# Patient Record
Sex: FEMALE | Race: WHITE | Employment: UNEMPLOYED | URBAN - METROPOLITAN AREA
[De-identification: names, ages, dates, MRNs, and addresses within clinical notes are randomized per-mention and may not be internally consistent; named-entity substitution may affect disease eponyms.]

---

## 2023-07-18 ENCOUNTER — APPOINTMENT (OUTPATIENT)
Dept: GENERAL RADIOLOGY | Age: 83
DRG: 504 | End: 2023-07-18
Payer: MEDICARE

## 2023-07-18 ENCOUNTER — HOSPITAL ENCOUNTER (INPATIENT)
Age: 83
LOS: 7 days | Discharge: HOME | DRG: 504 | End: 2023-07-25
Attending: EMERGENCY MEDICINE | Admitting: INTERNAL MEDICINE
Payer: MEDICARE

## 2023-07-18 ENCOUNTER — APPOINTMENT (OUTPATIENT)
Dept: MRI IMAGING | Age: 83
DRG: 504 | End: 2023-07-18
Payer: MEDICARE

## 2023-07-18 DIAGNOSIS — L08.9 RIGHT FOOT INFECTION: Primary | ICD-10-CM

## 2023-07-18 DIAGNOSIS — M86.171 ACUTE OSTEOMYELITIS OF RIGHT CALCANEUS (HCC): ICD-10-CM

## 2023-07-18 PROBLEM — I82.512 CHRONIC DEEP VEIN THROMBOSIS (DVT) OF FEMORAL VEIN OF LEFT LOWER EXTREMITY (HCC): Status: ACTIVE | Noted: 2019-07-12

## 2023-07-18 PROBLEM — R53.1 GENERALIZED WEAKNESS: Status: ACTIVE | Noted: 2023-05-13

## 2023-07-18 PROBLEM — I10 BENIGN ESSENTIAL HYPERTENSION: Status: ACTIVE | Noted: 2020-11-27

## 2023-07-18 LAB
ANION GAP SERPL CALCULATED.3IONS-SCNC: 9 MMOL/L (ref 9–17)
BASOPHILS # BLD: 0.09 K/UL (ref 0–0.2)
BASOPHILS NFR BLD: 1 % (ref 0–2)
BUN SERPL-MCNC: 15 MG/DL (ref 8–23)
BUN/CREAT SERPL: 21 (ref 9–20)
CALCIUM SERPL-MCNC: 8.9 MG/DL (ref 8.6–10.4)
CHLORIDE SERPL-SCNC: 104 MMOL/L (ref 98–107)
CO2 SERPL-SCNC: 27 MMOL/L (ref 20–31)
CREAT SERPL-MCNC: 0.7 MG/DL (ref 0.5–0.9)
CRP SERPL HS-MCNC: 17.5 MG/L (ref 0–5)
EOSINOPHIL # BLD: 0.66 K/UL (ref 0–0.44)
EOSINOPHILS RELATIVE PERCENT: 8 % (ref 1–4)
ERYTHROCYTE [DISTWIDTH] IN BLOOD BY AUTOMATED COUNT: 15.8 % (ref 11.8–14.4)
ERYTHROCYTE [SEDIMENTATION RATE] IN BLOOD BY PHOTOMETRIC METHOD: 84 MM/HR (ref 0–30)
GFR SERPL CREATININE-BSD FRML MDRD: >60 ML/MIN/1.73M2
GLUCOSE SERPL-MCNC: 88 MG/DL (ref 70–99)
HCT VFR BLD AUTO: 38.9 % (ref 36.3–47.1)
HGB BLD-MCNC: 11.8 G/DL (ref 11.9–15.1)
IMM GRANULOCYTES # BLD AUTO: 0.03 K/UL (ref 0–0.3)
IMM GRANULOCYTES NFR BLD: 0 %
LYMPHOCYTES # BLD: 26 % (ref 24–43)
LYMPHOCYTES NFR BLD: 2.28 K/UL (ref 1.1–3.7)
MCH RBC QN AUTO: 28.5 PG (ref 25.2–33.5)
MCHC RBC AUTO-ENTMCNC: 30.3 G/DL (ref 28.4–34.8)
MCV RBC AUTO: 94 FL (ref 82.6–102.9)
MONOCYTES NFR BLD: 0.59 K/UL (ref 0.1–1.2)
MONOCYTES NFR BLD: 7 % (ref 3–12)
NEUTROPHILS NFR BLD: 58 % (ref 36–65)
NEUTS SEG NFR BLD: 5.17 K/UL (ref 1.5–8.1)
NRBC BLD-RTO: 0 PER 100 WBC
PLATELET # BLD AUTO: 311 K/UL (ref 138–453)
PMV BLD AUTO: 10.2 FL (ref 8.1–13.5)
POTASSIUM SERPL-SCNC: 4 MMOL/L (ref 3.7–5.3)
RBC # BLD AUTO: 4.14 M/UL (ref 3.95–5.11)
RBC # BLD: ABNORMAL 10*6/UL
SODIUM SERPL-SCNC: 140 MMOL/L (ref 135–144)
WBC OTHER # BLD: 8.8 K/UL (ref 3.5–11.3)

## 2023-07-18 PROCEDURE — 99285 EMERGENCY DEPT VISIT HI MDM: CPT

## 2023-07-18 PROCEDURE — 2580000003 HC RX 258: Performed by: EMERGENCY MEDICINE

## 2023-07-18 PROCEDURE — 87070 CULTURE OTHR SPECIMN AEROBIC: CPT

## 2023-07-18 PROCEDURE — 6360000002 HC RX W HCPCS

## 2023-07-18 PROCEDURE — 86403 PARTICLE AGGLUT ANTBDY SCRN: CPT

## 2023-07-18 PROCEDURE — 1200000000 HC SEMI PRIVATE

## 2023-07-18 PROCEDURE — 6370000000 HC RX 637 (ALT 250 FOR IP): Performed by: EMERGENCY MEDICINE

## 2023-07-18 PROCEDURE — 85027 COMPLETE CBC AUTOMATED: CPT

## 2023-07-18 PROCEDURE — 80048 BASIC METABOLIC PNL TOTAL CA: CPT

## 2023-07-18 PROCEDURE — 73721 MRI JNT OF LWR EXTRE W/O DYE: CPT

## 2023-07-18 PROCEDURE — 6360000002 HC RX W HCPCS: Performed by: NURSE PRACTITIONER

## 2023-07-18 PROCEDURE — 73630 X-RAY EXAM OF FOOT: CPT

## 2023-07-18 PROCEDURE — 87077 CULTURE AEROBIC IDENTIFY: CPT

## 2023-07-18 PROCEDURE — 87205 SMEAR GRAM STAIN: CPT

## 2023-07-18 PROCEDURE — 87186 SC STD MICRODIL/AGAR DIL: CPT

## 2023-07-18 PROCEDURE — 87040 BLOOD CULTURE FOR BACTERIA: CPT

## 2023-07-18 PROCEDURE — 6360000002 HC RX W HCPCS: Performed by: EMERGENCY MEDICINE

## 2023-07-18 PROCEDURE — 2580000003 HC RX 258: Performed by: NURSE PRACTITIONER

## 2023-07-18 PROCEDURE — 99222 1ST HOSP IP/OBS MODERATE 55: CPT | Performed by: NURSE PRACTITIONER

## 2023-07-18 PROCEDURE — 36415 COLL VENOUS BLD VENIPUNCTURE: CPT

## 2023-07-18 PROCEDURE — 86140 C-REACTIVE PROTEIN: CPT

## 2023-07-18 PROCEDURE — 6370000000 HC RX 637 (ALT 250 FOR IP): Performed by: NURSE PRACTITIONER

## 2023-07-18 PROCEDURE — 87075 CULTR BACTERIA EXCEPT BLOOD: CPT

## 2023-07-18 PROCEDURE — 85652 RBC SED RATE AUTOMATED: CPT

## 2023-07-18 RX ORDER — OXYCODONE HYDROCHLORIDE 5 MG/1
2.5 TABLET ORAL ONCE
Status: COMPLETED | OUTPATIENT
Start: 2023-07-18 | End: 2023-07-18

## 2023-07-18 RX ORDER — POTASSIUM CHLORIDE 20 MEQ/1
40 TABLET, EXTENDED RELEASE ORAL PRN
Status: DISCONTINUED | OUTPATIENT
Start: 2023-07-18 | End: 2023-07-25 | Stop reason: HOSPADM

## 2023-07-18 RX ORDER — METRONIDAZOLE 500 MG/100ML
500 INJECTION, SOLUTION INTRAVENOUS EVERY 8 HOURS
Status: DISCONTINUED | OUTPATIENT
Start: 2023-07-18 | End: 2023-07-18

## 2023-07-18 RX ORDER — CLINDAMYCIN PHOSPHATE 600 MG/50ML
600 INJECTION INTRAVENOUS EVERY 6 HOURS
Status: DISCONTINUED | OUTPATIENT
Start: 2023-07-18 | End: 2023-07-23

## 2023-07-18 RX ORDER — POLYETHYLENE GLYCOL 3350 17 G/17G
17 POWDER, FOR SOLUTION ORAL DAILY
Status: ON HOLD | COMMUNITY
Start: 2023-07-15 | End: 2023-07-20

## 2023-07-18 RX ORDER — SODIUM CHLORIDE 0.9 % (FLUSH) 0.9 %
5-40 SYRINGE (ML) INJECTION EVERY 12 HOURS SCHEDULED
Status: DISCONTINUED | OUTPATIENT
Start: 2023-07-18 | End: 2023-07-25 | Stop reason: HOSPADM

## 2023-07-18 RX ORDER — ACETAMINOPHEN 650 MG/1
650 SUPPOSITORY RECTAL EVERY 6 HOURS PRN
Status: DISCONTINUED | OUTPATIENT
Start: 2023-07-18 | End: 2023-07-25 | Stop reason: HOSPADM

## 2023-07-18 RX ORDER — OXYCODONE HYDROCHLORIDE 5 MG/1
7.5 TABLET ORAL ONCE
Status: DISCONTINUED | OUTPATIENT
Start: 2023-07-18 | End: 2023-07-18

## 2023-07-18 RX ORDER — HYDROCODONE BITARTRATE AND ACETAMINOPHEN 7.5; 325 MG/1; MG/1
1 TABLET ORAL EVERY 6 HOURS PRN
Status: ON HOLD | COMMUNITY
End: 2023-07-20

## 2023-07-18 RX ORDER — SODIUM CHLORIDE 0.9 % (FLUSH) 0.9 %
10 SYRINGE (ML) INJECTION PRN
Status: DISCONTINUED | OUTPATIENT
Start: 2023-07-18 | End: 2023-07-25 | Stop reason: HOSPADM

## 2023-07-18 RX ORDER — HYDROCODONE BITARTRATE AND ACETAMINOPHEN 5; 325 MG/1; MG/1
1 TABLET ORAL ONCE
Status: COMPLETED | OUTPATIENT
Start: 2023-07-18 | End: 2023-07-18

## 2023-07-18 RX ORDER — MORPHINE SULFATE 4 MG/ML
4 INJECTION, SOLUTION INTRAMUSCULAR; INTRAVENOUS ONCE
Status: DISCONTINUED | OUTPATIENT
Start: 2023-07-18 | End: 2023-07-18

## 2023-07-18 RX ORDER — POTASSIUM CHLORIDE 7.45 MG/ML
10 INJECTION INTRAVENOUS PRN
Status: DISCONTINUED | OUTPATIENT
Start: 2023-07-18 | End: 2023-07-25 | Stop reason: HOSPADM

## 2023-07-18 RX ORDER — HYDROCODONE BITARTRATE AND ACETAMINOPHEN 5; 325 MG/1; MG/1
1 TABLET ORAL EVERY 4 HOURS PRN
Status: DISCONTINUED | OUTPATIENT
Start: 2023-07-18 | End: 2023-07-25 | Stop reason: HOSPADM

## 2023-07-18 RX ORDER — POLYETHYLENE GLYCOL 3350 17 G/17G
17 POWDER, FOR SOLUTION ORAL DAILY
Status: DISCONTINUED | OUTPATIENT
Start: 2023-07-18 | End: 2023-07-25 | Stop reason: HOSPADM

## 2023-07-18 RX ORDER — AMOXICILLIN 250 MG
2 CAPSULE ORAL NIGHTLY PRN
COMMUNITY

## 2023-07-18 RX ORDER — MAGNESIUM SULFATE 1 G/100ML
1000 INJECTION INTRAVENOUS PRN
Status: DISCONTINUED | OUTPATIENT
Start: 2023-07-18 | End: 2023-07-25 | Stop reason: HOSPADM

## 2023-07-18 RX ORDER — SENNA AND DOCUSATE SODIUM 50; 8.6 MG/1; MG/1
2 TABLET, FILM COATED ORAL DAILY
Status: DISCONTINUED | OUTPATIENT
Start: 2023-07-18 | End: 2023-07-25 | Stop reason: HOSPADM

## 2023-07-18 RX ORDER — POLYETHYLENE GLYCOL 3350 17 G/17G
17 POWDER, FOR SOLUTION ORAL DAILY PRN
Status: DISCONTINUED | OUTPATIENT
Start: 2023-07-18 | End: 2023-07-25 | Stop reason: HOSPADM

## 2023-07-18 RX ORDER — SODIUM CHLORIDE 9 MG/ML
INJECTION, SOLUTION INTRAVENOUS CONTINUOUS
Status: DISCONTINUED | OUTPATIENT
Start: 2023-07-18 | End: 2023-07-23

## 2023-07-18 RX ORDER — ONDANSETRON 4 MG/1
4 TABLET, ORALLY DISINTEGRATING ORAL EVERY 8 HOURS PRN
Status: DISCONTINUED | OUTPATIENT
Start: 2023-07-18 | End: 2023-07-25 | Stop reason: HOSPADM

## 2023-07-18 RX ORDER — DIPHENHYDRAMINE HCL 25 MG
25 TABLET ORAL EVERY 6 HOURS PRN
Status: DISCONTINUED | OUTPATIENT
Start: 2023-07-18 | End: 2023-07-25 | Stop reason: HOSPADM

## 2023-07-18 RX ORDER — HYDROCODONE BITARTRATE AND ACETAMINOPHEN 5; 325 MG/1; MG/1
2 TABLET ORAL EVERY 4 HOURS PRN
Status: DISCONTINUED | OUTPATIENT
Start: 2023-07-18 | End: 2023-07-25 | Stop reason: HOSPADM

## 2023-07-18 RX ORDER — ONDANSETRON 2 MG/ML
4 INJECTION INTRAMUSCULAR; INTRAVENOUS ONCE
Status: DISCONTINUED | OUTPATIENT
Start: 2023-07-18 | End: 2023-07-18

## 2023-07-18 RX ORDER — ENOXAPARIN SODIUM 100 MG/ML
40 INJECTION SUBCUTANEOUS DAILY
Status: DISCONTINUED | OUTPATIENT
Start: 2023-07-18 | End: 2023-07-23

## 2023-07-18 RX ORDER — HYDROCODONE BITARTRATE AND ACETAMINOPHEN 7.5; 325 MG/1; MG/1
1 TABLET ORAL EVERY 6 HOURS PRN
Status: DISCONTINUED | OUTPATIENT
Start: 2023-07-18 | End: 2023-07-18

## 2023-07-18 RX ORDER — ONDANSETRON 2 MG/ML
4 INJECTION INTRAMUSCULAR; INTRAVENOUS EVERY 6 HOURS PRN
Status: DISCONTINUED | OUTPATIENT
Start: 2023-07-18 | End: 2023-07-25 | Stop reason: HOSPADM

## 2023-07-18 RX ORDER — ACETAMINOPHEN 325 MG/1
650 TABLET ORAL EVERY 6 HOURS PRN
Status: DISCONTINUED | OUTPATIENT
Start: 2023-07-18 | End: 2023-07-25 | Stop reason: HOSPADM

## 2023-07-18 RX ORDER — SODIUM CHLORIDE 9 MG/ML
INJECTION, SOLUTION INTRAVENOUS PRN
Status: DISCONTINUED | OUTPATIENT
Start: 2023-07-18 | End: 2023-07-25 | Stop reason: HOSPADM

## 2023-07-18 RX ADMIN — SODIUM CHLORIDE: 9 INJECTION, SOLUTION INTRAVENOUS at 19:00

## 2023-07-18 RX ADMIN — ENOXAPARIN SODIUM 40 MG: 100 INJECTION SUBCUTANEOUS at 19:34

## 2023-07-18 RX ADMIN — OXYCODONE HYDROCHLORIDE 2.5 MG: 5 TABLET ORAL at 13:42

## 2023-07-18 RX ADMIN — HYDROCODONE BITARTRATE AND ACETAMINOPHEN 1 TABLET: 5; 325 TABLET ORAL at 13:50

## 2023-07-18 RX ADMIN — DIPHENHYDRAMINE HCL 25 MG: 25 TABLET ORAL at 19:01

## 2023-07-18 RX ADMIN — CLINDAMYCIN IN 5 PERCENT DEXTROSE 600 MG: 12 INJECTION, SOLUTION INTRAVENOUS at 20:48

## 2023-07-18 RX ADMIN — CEFEPIME 2000 MG: 2 INJECTION, POWDER, FOR SOLUTION INTRAVENOUS at 19:33

## 2023-07-18 RX ADMIN — VANCOMYCIN HYDROCHLORIDE 2500 MG: 5 INJECTION, POWDER, LYOPHILIZED, FOR SOLUTION INTRAVENOUS at 16:06

## 2023-07-18 RX ADMIN — METRONIDAZOLE 500 MG: 500 INJECTION, SOLUTION INTRAVENOUS at 15:17

## 2023-07-18 ASSESSMENT — PAIN DESCRIPTION - LOCATION
LOCATION: FOOT

## 2023-07-18 ASSESSMENT — PAIN DESCRIPTION - DESCRIPTORS
DESCRIPTORS: ACHING
DESCRIPTORS: ACHING;BURNING
DESCRIPTORS: ACHING

## 2023-07-18 ASSESSMENT — PAIN SCALES - GENERAL
PAINLEVEL_OUTOF10: 8
PAINLEVEL_OUTOF10: 8
PAINLEVEL_OUTOF10: 4

## 2023-07-18 ASSESSMENT — ENCOUNTER SYMPTOMS
CHEST TIGHTNESS: 0
NAUSEA: 0
DIARRHEA: 0
VOMITING: 0
CONSTIPATION: 0
COLOR CHANGE: 1
ABDOMINAL DISTENTION: 0
BACK PAIN: 0
COUGH: 0
EYE DISCHARGE: 0
EYE PAIN: 0
SHORTNESS OF BREATH: 0
FACIAL SWELLING: 0
ABDOMINAL PAIN: 0

## 2023-07-18 ASSESSMENT — PAIN DESCRIPTION - ORIENTATION
ORIENTATION: RIGHT
ORIENTATION: RIGHT
ORIENTATION: RIGHT;LOWER

## 2023-07-18 ASSESSMENT — PAIN - FUNCTIONAL ASSESSMENT: PAIN_FUNCTIONAL_ASSESSMENT: 0-10

## 2023-07-18 NOTE — PROGRESS NOTES
[address]  PROGRESS NOTE    Room # 2010/2010-02   Name: America Simpson              Reason for visit: Routine    I visited the patient. Admit Date & Time: 7/18/2023 12:16 PM    Assessment:  America Simpson is a 79 y.o. female. Upon entering the room patient states well, states no major needs or prayers. Patient kindly declines Spiritual Care visit.  leaves prayer card for patient for possible follow up as needed. Intervention:   provided a ministry presence. Outcome:  Patient grateful for the visit. Plan:  Chaplains will remain available to offer spiritual and emotional support as needed. Electronically signed by Chaplain Derek, on 7/18/2023 at 7:28 PM.  [address]      07/18/23 1926   Encounter Summary   Service Provided For: Patient   Referral/Consult From: [facility]   Support System Unknown   Last Encounter  07/18/23   Complexity of Encounter Low   Begin Time 0620   End Time  0625   Total Time Calculated 5 min   Encounter    Type Initial Screen/Assessment   Assessment/Intervention/Outcome   Assessment Coping   Intervention Sustaining Presence/Ministry of presence   Outcome Refused/Declined

## 2023-07-18 NOTE — CONSULTS
Consultation Note  Podiatric Medicine and Surgery     Subjective     Chief Complaint: right heel wound    HPI:  Columba Sidhu is a 79 y.o. female seen at [facility] ED for nonhealing right heel wound and possible concern for infection. Patient was evaluated by podiatrist from [place], who advised her to present to ER for concern for worsening wound infection. Patient states she has had this heel wound for quite some time, not currently on antibiotics. Patient reports wound is currently tender to touch, denies any changes in activity or trauma. Patient currently resides at an outside facility, reports she is not very active. Denies any other pedal complaints at this time. PCP is Kari Mckeon MD    ROS:   Review of Systems   Constitutional:  Negative for chills and fever. Respiratory:  Negative for chest tightness and shortness of breath. Cardiovascular:  Negative for chest pain and leg swelling. Gastrointestinal:  Negative for abdominal pain, constipation, diarrhea, nausea and vomiting. Musculoskeletal:  Positive for gait problem and myalgias. Negative for arthralgias and joint swelling. Skin:  Positive for color change and wound. Neurological:  Negative for weakness and numbness. Past Medical History   has no past medical history on file. Past Surgical History   has no past surgical history on file. Medications  Prior to Admission medications    Medication Sig Start Date End Date Taking? Authorizing Provider   apixaban (ELIQUIS) 5 MG TABS tablet Take 1 tablet by mouth 2 times daily   Yes Historical Provider, MD   HYDROcodone-acetaminophen (NORCO) 7.5-325 MG per tablet Take 1 tablet by mouth every 6 hours as needed for Pain.  Max Daily Amount: 4 tablets   Yes Historical Provider, MD   piperacillin-tazobactam (ZOSYN) 4-0.5 GM per 100ML IVPB Infuse 100 mLs intravenously every 6 hours   Yes Historical Provider, MD   senna-docusate (PERICOLACE) 8.6-50 MG per tablet Take 2 tablets by

## 2023-07-19 PROBLEM — Z79.01 ON CONTINUOUS ORAL ANTICOAGULATION: Status: ACTIVE | Noted: 2023-07-19

## 2023-07-19 PROBLEM — I87.2 VENOUS INSUFFICIENCY: Status: ACTIVE | Noted: 2023-07-19

## 2023-07-19 PROBLEM — G47.33 OSA (OBSTRUCTIVE SLEEP APNEA): Status: ACTIVE | Noted: 2023-07-19

## 2023-07-19 LAB
ANION GAP SERPL CALCULATED.3IONS-SCNC: 9 MMOL/L (ref 9–17)
BASOPHILS # BLD: 0.04 K/UL (ref 0–0.2)
BASOPHILS NFR BLD: 1 % (ref 0–2)
BUN SERPL-MCNC: 12 MG/DL (ref 8–23)
BUN/CREAT SERPL: 17 (ref 9–20)
CALCIUM SERPL-MCNC: 8.4 MG/DL (ref 8.6–10.4)
CHLORIDE SERPL-SCNC: 106 MMOL/L (ref 98–107)
CO2 SERPL-SCNC: 24 MMOL/L (ref 20–31)
CREAT SERPL-MCNC: 0.7 MG/DL (ref 0.5–0.9)
EOSINOPHIL # BLD: 0.35 K/UL (ref 0–0.44)
EOSINOPHILS RELATIVE PERCENT: 5 % (ref 1–4)
ERYTHROCYTE [DISTWIDTH] IN BLOOD BY AUTOMATED COUNT: 15.6 % (ref 11.8–14.4)
GFR SERPL CREATININE-BSD FRML MDRD: >60 ML/MIN/1.73M2
GLUCOSE SERPL-MCNC: 84 MG/DL (ref 70–99)
HCT VFR BLD AUTO: 35.8 % (ref 36.3–47.1)
HGB BLD-MCNC: 11 G/DL (ref 11.9–15.1)
IMM GRANULOCYTES # BLD AUTO: 0.03 K/UL (ref 0–0.3)
IMM GRANULOCYTES NFR BLD: 0 %
LYMPHOCYTES # BLD: 26 % (ref 24–43)
LYMPHOCYTES NFR BLD: 1.98 K/UL (ref 1.1–3.7)
MCH RBC QN AUTO: 28.6 PG (ref 25.2–33.5)
MCHC RBC AUTO-ENTMCNC: 30.7 G/DL (ref 28.4–34.8)
MCV RBC AUTO: 93.2 FL (ref 82.6–102.9)
MONOCYTES NFR BLD: 0.45 K/UL (ref 0.1–1.2)
MONOCYTES NFR BLD: 6 % (ref 3–12)
NEUTROPHILS NFR BLD: 62 % (ref 36–65)
NEUTS SEG NFR BLD: 4.8 K/UL (ref 1.5–8.1)
NRBC BLD-RTO: 0 PER 100 WBC
PLATELET # BLD AUTO: 260 K/UL (ref 138–453)
PMV BLD AUTO: 10.9 FL (ref 8.1–13.5)
POTASSIUM SERPL-SCNC: 4 MMOL/L (ref 3.7–5.3)
RBC # BLD AUTO: 3.84 M/UL (ref 3.95–5.11)
RBC # BLD: ABNORMAL 10*6/UL
SODIUM SERPL-SCNC: 139 MMOL/L (ref 135–144)
VANCOMYCIN SERPL-MCNC: <4 UG/ML
WBC OTHER # BLD: 7.7 K/UL (ref 3.5–11.3)

## 2023-07-19 PROCEDURE — 97167 OT EVAL HIGH COMPLEX 60 MIN: CPT

## 2023-07-19 PROCEDURE — 36415 COLL VENOUS BLD VENIPUNCTURE: CPT

## 2023-07-19 PROCEDURE — 85027 COMPLETE CBC AUTOMATED: CPT

## 2023-07-19 PROCEDURE — 80048 BASIC METABOLIC PNL TOTAL CA: CPT

## 2023-07-19 PROCEDURE — 1200000000 HC SEMI PRIVATE

## 2023-07-19 PROCEDURE — 99232 SBSQ HOSP IP/OBS MODERATE 35: CPT | Performed by: INTERNAL MEDICINE

## 2023-07-19 PROCEDURE — 97535 SELF CARE MNGMENT TRAINING: CPT

## 2023-07-19 PROCEDURE — 97110 THERAPEUTIC EXERCISES: CPT

## 2023-07-19 PROCEDURE — 97163 PT EVAL HIGH COMPLEX 45 MIN: CPT

## 2023-07-19 PROCEDURE — 97530 THERAPEUTIC ACTIVITIES: CPT

## 2023-07-19 PROCEDURE — 2580000003 HC RX 258: Performed by: NURSE PRACTITIONER

## 2023-07-19 PROCEDURE — 93923 UPR/LXTR ART STDY 3+ LVLS: CPT

## 2023-07-19 PROCEDURE — 6360000002 HC RX W HCPCS: Performed by: NURSE PRACTITIONER

## 2023-07-19 PROCEDURE — 2500000003 HC RX 250 WO HCPCS: Performed by: NURSE PRACTITIONER

## 2023-07-19 PROCEDURE — 6360000002 HC RX W HCPCS

## 2023-07-19 PROCEDURE — 80202 ASSAY OF VANCOMYCIN: CPT

## 2023-07-19 RX ORDER — METOPROLOL TARTRATE 5 MG/5ML
5 INJECTION INTRAVENOUS EVERY 4 HOURS PRN
Status: DISCONTINUED | OUTPATIENT
Start: 2023-07-19 | End: 2023-07-25 | Stop reason: HOSPADM

## 2023-07-19 RX ADMIN — CLINDAMYCIN IN 5 PERCENT DEXTROSE 600 MG: 12 INJECTION, SOLUTION INTRAVENOUS at 20:03

## 2023-07-19 RX ADMIN — CLINDAMYCIN IN 5 PERCENT DEXTROSE 600 MG: 12 INJECTION, SOLUTION INTRAVENOUS at 14:31

## 2023-07-19 RX ADMIN — SODIUM CHLORIDE: 9 INJECTION, SOLUTION INTRAVENOUS at 06:00

## 2023-07-19 RX ADMIN — CEFEPIME 2000 MG: 2 INJECTION, POWDER, FOR SOLUTION INTRAVENOUS at 07:10

## 2023-07-19 RX ADMIN — ENOXAPARIN SODIUM 40 MG: 100 INJECTION SUBCUTANEOUS at 20:04

## 2023-07-19 RX ADMIN — ONDANSETRON 4 MG: 2 INJECTION INTRAMUSCULAR; INTRAVENOUS at 08:26

## 2023-07-19 RX ADMIN — SODIUM CHLORIDE: 9 INJECTION, SOLUTION INTRAVENOUS at 20:03

## 2023-07-19 RX ADMIN — CLINDAMYCIN IN 5 PERCENT DEXTROSE 600 MG: 12 INJECTION, SOLUTION INTRAVENOUS at 02:37

## 2023-07-19 RX ADMIN — METOPROLOL TARTRATE 5 MG: 5 INJECTION INTRAVENOUS at 03:21

## 2023-07-19 RX ADMIN — CLINDAMYCIN IN 5 PERCENT DEXTROSE 600 MG: 12 INJECTION, SOLUTION INTRAVENOUS at 10:30

## 2023-07-19 RX ADMIN — CEFEPIME 2000 MG: 2 INJECTION, POWDER, FOR SOLUTION INTRAVENOUS at 20:04

## 2023-07-19 ASSESSMENT — PAIN DESCRIPTION - DESCRIPTORS: DESCRIPTORS: DISCOMFORT

## 2023-07-19 ASSESSMENT — PAIN DESCRIPTION - LOCATION: LOCATION: FOOT

## 2023-07-19 ASSESSMENT — PAIN DESCRIPTION - ORIENTATION: ORIENTATION: RIGHT

## 2023-07-19 ASSESSMENT — PAIN DESCRIPTION - FREQUENCY: FREQUENCY: INTERMITTENT

## 2023-07-19 ASSESSMENT — PAIN SCALES - GENERAL
PAINLEVEL_OUTOF10: 0
PAINLEVEL_OUTOF10: 7

## 2023-07-19 ASSESSMENT — PAIN DESCRIPTION - PAIN TYPE: TYPE: ACUTE PAIN

## 2023-07-19 NOTE — PROGRESS NOTES
Progress Note  Podiatric Medicine and Surgery     Subjective     CC: right heel wound    Patient seen and examined at bedside. Report pain in bilateral lower extremities. Feels like the offloading boots are too tight. Wonder when the surgery will be. NIVS was performed during exams. MRI performed last night. HPI :  Heriberto Floyd is a 79 y.o. female seen at [facility] ED for nonhealing right heel wound and possible concern for infection. Patient was evaluated by podiatrist from [place], who advised her to present to ER for concern for worsening wound infection. Patient states she has had this heel wound for quite some time, not currently on antibiotics. Patient reports wound is currently tender to touch, denies any changes in activity or trauma. Patient currently resides at an outside facility, reports she is not very active. Denies any other pedal complaints at this time. PCP is Ralf Paulino MD    ROS: Review of Systems   Constitutional:  Negative for chills and fever. Respiratory:  Negative for chest tightness and shortness of breath. Cardiovascular:  Negative for chest pain and leg swelling. Gastrointestinal:  Negative for abdominal pain, constipation, diarrhea, nausea and vomiting. Musculoskeletal:  Positive for gait problem and myalgias. Negative for arthralgias and joint swelling. Skin:  Positive for color change and wound. Neurological:  Negative for weakness and numbness.      Medications:  Scheduled Meds:   sodium chloride flush  5-40 mL IntraVENous 2 times per day    enoxaparin  40 mg SubCUTAneous Daily    [Held by provider] apixaban  5 mg Oral BID    polyethylene glycol  17 g Oral Daily    sennosides-docusate sodium  2 tablet Oral Daily    cefepime  2,000 mg IntraVENous Q12H    clindamycin (CLEOCIN) IV  600 mg IntraVENous Q6H       Continuous Infusions:   sodium chloride 75 mL/hr at 07/19/23 0600    sodium chloride         PRN Meds:metoprolol, sodium chloride flush, lower extremity (HCC)    Generalized weakness  Resolved Problems:    * No resolved hospital problems. *       Plan     Patient examined and evaluated at bedside   Treatment options discussed in detail with the patient  Radiographs reviewed of right heel with patient. Erosive changes noted to right calcaneus. Findings consistent with osteomyelitis. NIVS result pending. Surgical plan pending on result. Tentatively plan for Friday, 7/21/23 at 3pm .  MRI shows: abscess and osteomyelitis noted to the dorsal calcaneus.    IV abx: Clindamycin, cefepime  Dressing applied to Right foot: betadine WTD, DSD and ACE  NWB to Right lower extremity  Discussed with Dr. Simi Ortiz, [facility]   Podiatric Medicine & Surgery   7/19/2023 at 11:51 AM

## 2023-07-19 NOTE — PROGRESS NOTES
Occupational Therapy  Facility/Department: [facility] MED SURG  Occupational Therapy Initial Assessment    Name: Tova Medel  : 1940  MRN: [id number]  Date of Service: 2023    Discharge Recommendations:  Patient would benefit from continued therapy after discharge   Patient would benefit from SNF for continued occupational therapy to increase independence with  ADL of bathing, dressing, toileting and grooming. Writer recommending SNF placement for for activity tolerance and strength which will increase independence with ADL's coordinated with bed mobility and chair transfers. Continued skilled OT services to address decreased safety awareness with ADL and IADL tasks and for education and increased independence with DME and AE for fall prevention and ec/ws techniques prior to d/c home. Patient Diagnosis(es): There were no encounter diagnoses. Past Medical History:  has a past medical history of DVT (deep venous thrombosis) ([address]). Past Surgical History:  has no past surgical history on file. RN reports patient is medically stable for therapy treatment this date. Chart reviewed prior to treatment and patient is agreeable for therapy. All lines intact and patient positioned comfortably at end of treatment. All patient needs addressed prior to ending therapy session. HPI: Tova Medel is a 79 y.o. female seen at [facility] ED for nonhealing right heel wound and possible concern for infection. Patient was evaluated by podiatrist from [place], who advised her to present to ER for concern for worsening wound infection. Patient states she has had this heel wound for quite some time, not currently on antibiotics. Patient reports wound is currently tender to touch, denies any changes in activity or trauma. Patient currently resides at an outside facility, reports she is not very active. Denies any other pedal complaints at this time.     Assessment   Performance deficits / Impairments: through full ROM to encourage functional reach and to prevent contractures/further skin breakdown. Activity Tolerance  Activity Tolerance: Patient limited by fatigue;Patient limited by pain;Treatment limited secondary to medical complications  Bed mobility  Rolling to Left: Moderate assistance;2 Person assistance (to achieve full sidelying position)  Rolling to Right: Moderate assistance;2 Person assistance  Bed Mobility Comments: Pt. uses upper body only to attempt rolling. Encouraged to incorporate LEs to best of ability to assist.  Pt. declined sitting EOB at this time as she has had many people in room and is fatigued. Transfers  Sit to stand: Unable to assess  Stand to sit: Unable to assess  Hearing  Hearing: Within functional limits  Cognition  Cognition Comment: Pt's cognition mostly intact - she has been in inpatient facilites for several months and events/ timelines seem to be running together. Pt does seem to have dec. insight into level of deficits and risks for further complications that may further hinder her from from being able to reach her goal of returning home. Orientation  Overall Orientation Status: Within Functional Limits  Perception  Overall Perceptual Status: WFL               Education Given To: Patient  Education Provided: Role of Therapy;Plan of Care;Home Exercise Program;Precautions; ADL Adaptive Strategies;Transfer Training;Energy Conservation; Fall Prevention Strategies; Equipment  Education Provided Comments: pressure relief, spirometer use,, B UE ROM, bed mob techs, benefits of OOT activity  Education Method: Demonstration;Verbal  Education Outcome: Continued education needed;Verbalized understanding      AM-PAC Score        AM-[facility] Inpatient Daily Activity Raw Score: 12 (07/19/23 1258)  AM-PAC Inpatient ADL T-Scale Score : 30.6 (07/19/23 1258)  ADL Inpatient CMS 0-100% Score: 66.57 (07/19/23 1258)  ADL Inpatient CMS G-Code Modifier : CL (07/19/23 1258)    Tinneti Score

## 2023-07-19 NOTE — PLAN OF CARE
Problem: Pain  Goal: Verbalizes/displays adequate comfort level or baseline comfort level  Outcome: Progressing  Flowsheets (Taken 7/19/2023 1206)  Verbalizes/displays adequate comfort level or baseline comfort level:   Encourage patient to monitor pain and request assistance   Assess pain using appropriate pain scale   Administer analgesics based on type and severity of pain and evaluate response   Implement non-pharmacological measures as appropriate and evaluate response

## 2023-07-19 NOTE — PROGRESS NOTES
[facility]  Office: [address], DO, Corry Elmwood, DO, Sapphire Fields, DO, Any Kramer Blood, DO, Delphine Campa MD, Inés Garay MD, Danielle Wong MD, Halina Nava MD,  Elena Pro MD, Lorie Nyhan, MD, Marcin Hull, DO, David Diaz MD,  Bradley Vaughn MD, Deanna Griffith MD, Siddharth Castrejon DO, Case Spain MD,  Charo Brooks DO, Ham Rascon MD, Laney Infante MD, Daniel Aguayo MD, Andressa Martinez MD,  Charlene Ontiveros MD, Lu Clements MD, Bettina Carrillo DO, Griselda Dickson MD,  Marquita Alfonso MD, Swapna Reaves, Jennifer Montano, CNP, Daniela Gilbert, CNP, Siena Johnson, CNP,  Jessica Leonard, [facility], Kimberlee Braun, CNP, Carlyn Brown, CNP, Zaire Borrero, CNP, Rosanna Harkins, CNP, Hudson Coleman, CNP, Sukhjinder Herrera PA-C, tSephani Monk, [facility], Orquidea Maza, [facility], Ferdinand Rodriguez, [facility]    Progress Note    7/19/2023    3:04 PM    Name:   Buddy York  MRN:     [id number]     Acct:      [de-identified]   Room:   2010/2010-02   Day:  1  Admit Date:  7/18/2023 12:16 PM    PCP:   Belem Armendariz MD  Code Status:  Full Code    Subjective:     C/C:   Chief Complaint   Patient presents with    Wound Infection     Patient arrived via EMS from [address] [place] [address] to be evaluated by podiatry. Patient has a right foot infection with a hx of osteomyelitis. Interval History Status: improved. Patient is resting in bed and denies any complaints of chest pain, shortness of breath, nausea vomiting, fevers or chills or other acute complaints. Awaiting podiatry for surgical plans. Brief History: This is an 44-year-old female that presents with a complaint of right foot wound. She apparently is been residing at the care facility is been bedbound and developed a heel wound and there is now concern for infection and possible osteomyelitis.     Review of Systems: of the dorsal calcaneus. 2. Osteochondral lesion of the medial talar dome measuring approximately 1.3 x 1.4 cm with small amount of undermining fluid signal present. Unstable fragment cannot be excluded. 3. 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 4. Evidence for remote sprain of the intact lateral collateral ligament complex. Physical Examination:        General appearance:  alert, cooperative and no distress  Mental Status:  oriented to person, place and time and normal affect  Lungs:  clear to auscultation bilaterally, normal effort  Heart:  regular rate and rhythm, no murmur  Abdomen:  soft, nontender, nondistended, normal bowel sounds, no masses, hepatomegaly, splenomegaly  Extremities:  no edema, redness, tenderness in the calves  Skin:  no gross lesions, rashes, induration    Assessment:        Hospital Problems             Last Modified POA    * (Principal) Right foot infection 7/18/2023 Yes    Chronic deep vein thrombosis (DVT) of femoral vein of left lower extremity ([address]) 7/18/2023 Yes    Overview Signed 7/18/2023  4:44 PM by KILO Thrasher CNP     Last Assessment & Plan:   Formatting of this note might be different from the original.  No signs of bleeding. Continue with apixaban. Generalized weakness 7/18/2023 Yes    Overview Signed 7/18/2023  4:44 PM by KILO Thrasher CNP     Last Assessment & Plan:   Formatting of this note might be different from the original.  Could be related to sepsis. Physical and Occupational Therapy evaluated the patient and recommended skilled nursing.            Benign essential hypertension 7/19/2023 Yes    ALANA (obstructive sleep apnea) 7/19/2023 Yes    Venous insufficiency 7/19/2023 Yes    On continuous oral anticoagulation 7/19/2023 Yes       Plan:        Cefepime and Vanco as ordered  Continue IV hydration  Continue home antihypertensive therapy  Podiatry evaluation

## 2023-07-19 NOTE — PROGRESS NOTES
5-7 times per week  Specific Instructions for Next Treatment: Reassess full bed mob. / sitting balance when approp. Pt. has been levar lift within last month. Awaiting surgical plan. Safety Devices  Type of Devices: Call light within reach, Left in bed, Nurse notified     Restrictions  Restrictions/Precautions  Restrictions/Precautions: Weight Bearing, NPO, Bed Alarm, Up as Tolerated, General Precautions  Lower Extremity Weight Bearing Restrictions  Right Lower Extremity Weight Bearing: Non Weight Bearing (heel sore/ infection)  Position Activity Restriction  Other position/activity restrictions: IV, purwick, bilat. heel offloading podous boots     Subjective   General  Chart Reviewed: Yes  Patient assessed for rehabilitation services?: Yes  Family / Caregiver Present: No  Follows Commands: Within Functional Limits         Social/Functional History  Social/Functional History  Type of Home:  (Normally lives in a home on [address] [address] [facility]. Has not been home for several months and will bed returning to SNF/rehab. at discharge)  ADL Assistance: Needs assistance (able to do upper body bathing/ dressing at Rehab;  she was wearing a brief for toileting and rolling to change with staff)  Ambulation Assistance: Non-ambulatory (Three weeks ago, pt. being transferred to w/c via levar and taken to parallel bars. Was standing and weight shifting. Has not amb.  for 4 months.)  Transfer Assistance: Needs assistance (levar for several months)  Active : No  Additional Comments: came from [address] [facility] in [address]- was getting up via [place] lift to w/c and going to therapy gym  Vision/Hearing       Cognition   Orientation  Overall Orientation Status: Within Functional Limits  Cognition  Cognition Comment: Pt's cognition mostly intact - she has been in inpatient facilites for several months and events/ timelines seem to be running together     Objective   Pulse: 87  Heart Rate Source: Monitor  BP: 135/75  BP Location: Left Arm  MAP tolerate ther ex x4 extremities in supine postion  Short Term Goal 2: Pt to be REASSESSED for supine <> sit transfers, etc. when approp . Awaiting surgical plan. Short Term Goal 3: Pt. to be indep with pressure relief in bed by rolling L/R, moving UEs / LEs  Patient Goals   Patient Goals : return homr       Education  Patient Education  Education Given To: Patient  Education Provided: Role of Therapy;Plan of Care  Education Provided Comments: impt of OOB, see EX section  Education Method: Demonstration;Verbal  Education Outcome: Continued education needed      Therapy Time   Individual Concurrent Group Co-treatment   Time In 0945         Time Out 1025         Minutes 40             Treatment time:  30min. Co-treatment with OT warranted secondary to decreased safety and independence requiring 2 skilled therapy professionals to address individual discipline's goals.  .      Melita Parish, PT

## 2023-07-20 LAB
ALBUMIN SERPL-MCNC: 2.6 G/DL (ref 3.5–5.2)
ALP SERPL-CCNC: 58 U/L (ref 35–104)
ALT SERPL-CCNC: 8 U/L (ref 5–33)
ANION GAP SERPL CALCULATED.3IONS-SCNC: 8 MMOL/L (ref 9–17)
AST SERPL-CCNC: 13 U/L
BASOPHILS # BLD: 0.04 K/UL (ref 0–0.2)
BASOPHILS NFR BLD: 1 % (ref 0–2)
BILIRUB SERPL-MCNC: 0.3 MG/DL (ref 0.3–1.2)
BUN SERPL-MCNC: 11 MG/DL (ref 8–23)
BUN/CREAT SERPL: 16 (ref 9–20)
CALCIUM SERPL-MCNC: 8.2 MG/DL (ref 8.6–10.4)
CHLORIDE SERPL-SCNC: 109 MMOL/L (ref 98–107)
CO2 SERPL-SCNC: 25 MMOL/L (ref 20–31)
CREAT SERPL-MCNC: 0.7 MG/DL (ref 0.5–0.9)
CRP SERPL HS-MCNC: 11 MG/L (ref 0–5)
EOSINOPHIL # BLD: 0.48 K/UL (ref 0–0.44)
EOSINOPHILS RELATIVE PERCENT: 7 % (ref 1–4)
ERYTHROCYTE [DISTWIDTH] IN BLOOD BY AUTOMATED COUNT: 15.7 % (ref 11.8–14.4)
ERYTHROCYTE [SEDIMENTATION RATE] IN BLOOD BY PHOTOMETRIC METHOD: 41 MM/HR (ref 0–30)
GFR SERPL CREATININE-BSD FRML MDRD: >60 ML/MIN/1.73M2
GLUCOSE SERPL-MCNC: 89 MG/DL (ref 70–99)
HCT VFR BLD AUTO: 32.8 % (ref 36.3–47.1)
HGB BLD-MCNC: 10.2 G/DL (ref 11.9–15.1)
IMM GRANULOCYTES # BLD AUTO: 0.02 K/UL (ref 0–0.3)
IMM GRANULOCYTES NFR BLD: 0 %
LYMPHOCYTES NFR BLD: 2.3 K/UL (ref 1.1–3.7)
LYMPHOCYTES RELATIVE PERCENT: 33 % (ref 24–43)
MCH RBC QN AUTO: 28.8 PG (ref 25.2–33.5)
MCHC RBC AUTO-ENTMCNC: 31.1 G/DL (ref 28.4–34.8)
MCV RBC AUTO: 92.7 FL (ref 82.6–102.9)
MONOCYTES NFR BLD: 0.56 K/UL (ref 0.1–1.2)
MONOCYTES NFR BLD: 8 % (ref 3–12)
NEUTROPHILS NFR BLD: 51 % (ref 36–65)
NEUTS SEG NFR BLD: 3.62 K/UL (ref 1.5–8.1)
NRBC BLD-RTO: 0 PER 100 WBC
PLATELET # BLD AUTO: 216 K/UL (ref 138–453)
PMV BLD AUTO: 10 FL (ref 8.1–13.5)
POTASSIUM SERPL-SCNC: 3.6 MMOL/L (ref 3.7–5.3)
PROT SERPL-MCNC: 5.4 G/DL (ref 6.4–8.3)
RBC # BLD AUTO: 3.54 M/UL (ref 3.95–5.11)
RBC # BLD: ABNORMAL 10*6/UL
SODIUM SERPL-SCNC: 142 MMOL/L (ref 135–144)
WBC OTHER # BLD: 7 K/UL (ref 3.5–11.3)

## 2023-07-20 PROCEDURE — 36415 COLL VENOUS BLD VENIPUNCTURE: CPT

## 2023-07-20 PROCEDURE — 2580000003 HC RX 258: Performed by: NURSE PRACTITIONER

## 2023-07-20 PROCEDURE — 97110 THERAPEUTIC EXERCISES: CPT

## 2023-07-20 PROCEDURE — 85652 RBC SED RATE AUTOMATED: CPT

## 2023-07-20 PROCEDURE — 80053 COMPREHEN METABOLIC PANEL: CPT

## 2023-07-20 PROCEDURE — 6360000002 HC RX W HCPCS: Performed by: NURSE PRACTITIONER

## 2023-07-20 PROCEDURE — 97168 OT RE-EVAL EST PLAN CARE: CPT

## 2023-07-20 PROCEDURE — 97530 THERAPEUTIC ACTIVITIES: CPT

## 2023-07-20 PROCEDURE — 97164 PT RE-EVAL EST PLAN CARE: CPT

## 2023-07-20 PROCEDURE — 85027 COMPLETE CBC AUTOMATED: CPT

## 2023-07-20 PROCEDURE — 6370000000 HC RX 637 (ALT 250 FOR IP): Performed by: NURSE PRACTITIONER

## 2023-07-20 PROCEDURE — 86140 C-REACTIVE PROTEIN: CPT

## 2023-07-20 PROCEDURE — 1200000000 HC SEMI PRIVATE

## 2023-07-20 PROCEDURE — 6360000002 HC RX W HCPCS

## 2023-07-20 PROCEDURE — 99232 SBSQ HOSP IP/OBS MODERATE 35: CPT | Performed by: INTERNAL MEDICINE

## 2023-07-20 PROCEDURE — 97535 SELF CARE MNGMENT TRAINING: CPT

## 2023-07-20 RX ORDER — WARFARIN SODIUM 5 MG/1
5 TABLET ORAL DAILY
Status: ON HOLD | COMMUNITY
End: 2023-07-21

## 2023-07-20 RX ADMIN — HYDROCODONE BITARTRATE AND ACETAMINOPHEN 2 TABLET: 5; 325 TABLET ORAL at 04:25

## 2023-07-20 RX ADMIN — CEFEPIME 2000 MG: 2 INJECTION, POWDER, FOR SOLUTION INTRAVENOUS at 19:28

## 2023-07-20 RX ADMIN — CLINDAMYCIN IN 5 PERCENT DEXTROSE 600 MG: 12 INJECTION, SOLUTION INTRAVENOUS at 15:12

## 2023-07-20 RX ADMIN — SODIUM CHLORIDE, PRESERVATIVE FREE 10 ML: 5 INJECTION INTRAVENOUS at 21:37

## 2023-07-20 RX ADMIN — CLINDAMYCIN IN 5 PERCENT DEXTROSE 600 MG: 12 INJECTION, SOLUTION INTRAVENOUS at 08:32

## 2023-07-20 RX ADMIN — CLINDAMYCIN IN 5 PERCENT DEXTROSE 600 MG: 12 INJECTION, SOLUTION INTRAVENOUS at 21:35

## 2023-07-20 RX ADMIN — CLINDAMYCIN IN 5 PERCENT DEXTROSE 600 MG: 12 INJECTION, SOLUTION INTRAVENOUS at 03:29

## 2023-07-20 RX ADMIN — ONDANSETRON 4 MG: 2 INJECTION INTRAMUSCULAR; INTRAVENOUS at 15:10

## 2023-07-20 RX ADMIN — SODIUM CHLORIDE: 9 INJECTION, SOLUTION INTRAVENOUS at 19:30

## 2023-07-20 RX ADMIN — ENOXAPARIN SODIUM 40 MG: 100 INJECTION SUBCUTANEOUS at 21:34

## 2023-07-20 RX ADMIN — CEFEPIME 2000 MG: 2 INJECTION, POWDER, FOR SOLUTION INTRAVENOUS at 08:34

## 2023-07-20 RX ADMIN — SODIUM CHLORIDE: 9 INJECTION, SOLUTION INTRAVENOUS at 08:31

## 2023-07-20 ASSESSMENT — PAIN DESCRIPTION - LOCATION: LOCATION: FOOT

## 2023-07-20 ASSESSMENT — PAIN SCALES - GENERAL: PAINLEVEL_OUTOF10: 8

## 2023-07-20 NOTE — PLAN OF CARE
handling equipment as needed   Obtain physical therapy/occupational therapy consults as needed   Apply continuous passive motion per provider or physical therapy orders to increase flexion toward goal   Instruct patient/family in ordered activity level     Problem: Musculoskeletal - Adult  Goal: Maintain proper alignment of affected body part  Outcome: Progressing  Flowsheets (Taken 7/20/2023 0900)  Maintain proper alignment of affected body part: Support and protect limb and body alignment per provider's orders     Problem: Musculoskeletal - Adult  Goal: Return ADL status to a safe level of function  Outcome: Progressing  Flowsheets (Taken 7/20/2023 0900)  Return ADL Status to a Safe Level of Function:   Administer medication as ordered   Assess activities of daily living deficits and provide assistive devices as needed   Obtain physical therapy/occupational therapy consults as needed   Assist and instruct patient to increase activity and self care as tolerated     Problem: Neurosensory - Adult  Goal: Achieves stable or improved neurological status  Outcome: Progressing     Problem: Neurosensory - Adult  Goal: Achieves maximal functionality and self care  Outcome: Progressing

## 2023-07-20 NOTE — PROGRESS NOTES
Transitions of Care Pharmacy Service   Medication Review    The patient's list of current home medications has been reviewed. Source(s) of information: Sure scripts, [facility], pt    Based on information provided by the above source(s), I have updated the patient's home med list as described below. Please review the ACTION REQUESTED section of this note for any discrepancies on current hospital orders. I changed or updated the following medications on the patient's home medication list:  Discontinued Eliquis 5 mg  Norco 7.5 - 325 mg  Miralax     Added Warfarin 5 mg     Adjusted   Senna - S to prn     Other Notes Pt says she could not afford Eliquis so she continued to take Warfarin at home. [facility] did not have fill [facility]x for warfarin. She believes she is taking once daily. PROVIDER ACTION REQUESTED  Medications that need to be addressed by a physician/nurse practitioner:    Medication Action Requested        N/A         Please feel free to call me with any questions about this encounter. Thank you. This note will be reviewed and co-signed by the Transitions of Care Pharmacist.    Mynor Hoover PharmD student  Transitions of [facility] Pharm[facility] Service  Phone:  [phone]  Fax: [phone]      Electronically signed by Mynor Hoover on 7/20/2023 at 10:56 AM       Prior to Admission medications    Medication Sig Start Date End Date Taking?  Authorizing Provider   warfarin (COUMADIN) 5 MG tablet Take 1 tablet by mouth daily   Yes Historical Provider, MD   piperacillin-tazobactam (ZOSYN) 4-0.5 GM per 100ML IVPB Infuse 100 mLs intravenously every 6 hours   Yes Historical Provider, MD   senna-docusate (PERICOLACE) 8.6-50 MG per tablet Take 2 tablets by mouth daily as needed   Yes Historical Provider, MD

## 2023-07-20 NOTE — PROGRESS NOTES
Occupational Therapy  Facility/Department: [facility] MED SURG  Occupational Therapy RE-Assessment    Name: Tova Medel  : 1940  MRN: [id number]  Date of Service: 2023    Discharge Recommendations:  Patient would benefit from continued therapy after discharge   Pt currently functioning below baseline. Recommend daily inpatient skilled therapy at time of discharge to maximize long term outcomes and prevent re-admission. Please refer to AM-PAC score for current level of function. Patient Diagnosis(es): The encounter diagnosis was Right foot infection. Past Medical History:  has a past medical history of DVT (deep venous thrombosis) ([address]), Hypertension, Lumbar stenosis, ALANA (obstructive sleep apnea), and Venous insufficiency. Past Surgical History:  has no past surgical history on file. RN reports patient is medically stable for therapy treatment this date. Chart reviewed prior to treatment and patient is agreeable for therapy. All lines intact and patient positioned comfortably at end of treatment. All patient needs addressed prior to ending therapy session. Assessment   Performance deficits / Impairments: Decreased functional mobility ; Decreased ADL status; Decreased strength;Decreased safe awareness;Decreased endurance;Decreased posture;Decreased sensation;Decreased balance  Assessment: Goals reassessed this date as pt able to tolerate sitting at EOB. Pt with very rigid trunk d/t lack of mobility, c/o back pain throughout. Pt continues to benefit from skilled OT tx to address defcits in multiple areas: ADLs, bed mob, UE strength, trunk/core strength and mob, and activity tolernace. Prognosis: Fair  REQUIRES OT FOLLOW-UP: Yes  Activity Tolerance  Activity Tolerance: Patient limited by fatigue  Activity Tolerance Comments: pt showing improvement since yesterday's session; tolerated sitting EOB x 15 min. Goals reassessed.         Plan   Occupational Therapy Plan  Times Per Week: assess  Stand to sit: Unable to assess  Vision  Vision: Impaired  Vision Exceptions: Wears glasses for reading  Hearing  Hearing: Within functional limits  Cognition  Overall Cognitive Status: WFL  Arousal/Alertness: Appropriate responses to stimuli  Following Commands: Follows multistep commands with repitition; Follows multistep commands with increased time  Attention Span: Attends with cues to redirect  Problem Solving: Assistance required to generate solutions;Assistance required to implement solutions;Assistance required to correct errors made;Assistance required to identify errors made;Decreased awareness of errors  Insights: Decreased awareness of deficits  Initiation: Requires cues for all  Sequencing: Requires cues for all  Cognition Comment: Pt's cognition mostly intact - she has been in inpatient facilites for several months and events/ timelines seem to be running together. Pt does seem to have dec. insight into level of deficits and risks for further complications that may further hinder her from from being able to reach her goal of returning home. Orientation  Overall Orientation Status: Within Functional Limits  Perception  Overall Perceptual Status: WFL               Education Given To: Patient  Education Provided: Role of Therapy;Plan of Care;Home Exercise Program;Precautions; ADL Adaptive Strategies;Transfer Training;Energy Conservation; Fall Prevention Strategies; Equipment  Education Provided Comments: pressure relief, spirometer use,, B UE ROM, bed mob techs, benefits of OOT activity  Education Method: Demonstration;Verbal  Education Outcome: Continued education needed;Verbalized understanding              AM-PAC Score        AM-PAC Inpatient Daily Activity Raw Score: 12 (07/20/23 1459)  AM-PAC Inpatient ADL T-Scale Score : 30.6 (07/20/23 1459)  ADL Inpatient CMS 0-100% Score: 66.57 (07/20/23 1459)  ADL Inpatient CMS G-Code Modifier : CL (07/20/23 1459)    Tinneti Score       Goals  Short Term

## 2023-07-20 NOTE — PROGRESS NOTES
Progress Note  Podiatric Medicine and Surgery     Subjective     CC: right heel wound    -Patient was seen and examined at bedside during rounding this morning  -Patient states that she did experience some mild pain to her right heel last night however it was controlled with pain medication  -Patient is curious on her surgical plan and when her surgery will be performed. All questions answered to the patient's satisfaction and understanding  -Patient denies any systemic signs of infection including nausea, vomiting, fever, chills, diarrhea, shortness of breath or chest pain at this time    Trended labs:  -WBC: 8.8--> 7.7--> 7.0  -ESR: 84--> 41  -CRP: 17.5--> 11.0    HPI :  Elena Liao is a 79 y.o. female seen at [facility] ED for nonhealing right heel wound and possible concern for infection. Patient was evaluated by podiatrist from [place], who advised her to present to ER for concern for worsening wound infection. Patient states she has had this heel wound for quite some time, not currently on antibiotics. Patient reports wound is currently tender to touch, denies any changes in activity or trauma. Patient currently resides at an outside facility, reports she is not very active. Denies any other pedal complaints at this time. PCP is Michele Smith MD    ROS: Review of Systems   Constitutional:  Negative for chills and fever. Respiratory:  Negative for chest tightness and shortness of breath. Cardiovascular:  Negative for chest pain and leg swelling. Gastrointestinal:  Negative for abdominal pain, constipation, diarrhea, nausea and vomiting. Musculoskeletal:  Positive for gait problem and myalgias. Negative for arthralgias and joint swelling. Skin:  Positive for color change and wound. Neurological:  Negative for weakness and numbness.      Medications:  Scheduled Meds:   sodium chloride flush  5-40 mL IntraVENous 2 times per day    enoxaparin  40 mg SubCUTAneous Daily    [Held by

## 2023-07-20 NOTE — PROGRESS NOTES
Physical Therapy  Facility/Department: [facility] MED SURG  Physical Therapy ReAssessment    Name: Jose Carlos Blankenship  : 1940  MRN: [id number]  Date of Service: 2023    Discharge Recommendations:  Patient would benefit from continued therapy after discharge. Pt currently functioning below baseline. Recommend daily inpatient skilled therapy at time of discharge to maximize long term outcomes and prevent re-admission. Please refer to AM-PAC score for current level of function. HPI (per chart):  79 y.o. female seen at [facility] ED for nonhealing right heel wound and possible concern for infection. Patient was evaluated by podiatrist from [place], who advised her to present to ER for concern for worsening wound infection. Patient states she has had this heel wound for quite some time, not currently on antibiotics. Patient Diagnosis(es): The encounter diagnosis was Right foot infection. Past Medical History:  has a past medical history of DVT (deep venous thrombosis) ([address]), Hypertension, Lumbar stenosis, ALANA (obstructive sleep apnea), and Venous insufficiency. Past Surgical History:  has no past surgical history on file. Assessment   Body Structures, Functions, Activity Limitations Requiring Skilled Therapeutic Intervention: Decreased ROM; Decreased strength;Decreased safe awareness;Decreased endurance;Decreased balance;Decreased posture  Assessment: Pt. is 79 y.o. and very deconditioned after several months of limited mobility due to heel wound Rt. foot. Most recently pt. has been a levar lift at a rehab facility. Patient able to sit EOB on this date with max assist x 2. Patient will benefit from skilled PT to improve bed mobility, strength, seated balance, and positioning.   Therapy Prognosis: Fair  Decision Making: High Complexity  Requires PT Follow-Up: Yes  Activity Tolerance  Activity Tolerance: Patient limited by endurance     Plan   Physcial Therapy Plan  General Plan: 5-7 times per week  Specific

## 2023-07-20 NOTE — CARE COORDINATION
Case Management Assessment  Initial Evaluation    Date/Time of Evaluation: 7/20/2023 9:11 AM  Assessment Completed by: NUNO Zheng    If patient is discharged prior to next notation, then this note serves as note for discharge by case management. Patient Name: Aline Guerra                   YOB: 1940  Diagnosis: Right foot infection [L08.9]                   Date / Time: 7/18/2023 12:16 PM    Patient Admission Status: Inpatient   Readmission Risk (Low < 19, Mod (19-27), High > 27): Readmission Risk Score: 13.2    Current PCP: Ras Chavez MD  PCP verified by CM? Yes    Chart Reviewed: Yes      History Provided by: Patient  Patient Orientation: Alert and Oriented    Patient Cognition: Alert    Hospitalization in the last 30 days (Readmission):  Yes    If yes, Readmission Assessment in  Navigator will be completed. Advance Directives:      Code Status: Full Code   Patient's Primary Decision Maker is: Legal Next of Kin      Discharge Planning:    Patient lives with: Alone Type of Home: Skilled Nursing Facility  Primary Care Giver: Self  Patient Support Systems include: Friends/Neighbors   Current Financial resources: Medicare  Current community resources:    Current services prior to admission: [facility] ([facility])            Current DME: Janett Mckeon            Type of Home Care services:  None    ADLS  Prior functional level: Assistance with the following:, Shopping, Housework, Mobility, Dressing  Current functional level: Assistance with the following:, Mobility, Shopping, Housework, Dressing    PT AM-PAC: 8 /24  OT AM-PAC: 12 /24    Family can provide assistance at DC: No  Would you like Case Management to discuss the discharge plan with any other family members/significant others, and if so, who?  No  Plans to Return to Present Housing: Unknown at present  Other Identified Issues/Barriers to RETURNING to current housing: possible post

## 2023-07-20 NOTE — PROGRESS NOTES
[facility]  Office: [id number], DO, Nydia Koo, DO, Jesus Alberto Angel, DO, Alvino Donellglo Blood, DO, Carmelita Tariq MD, Claudia Mayer MD, Malcom Lux MD, Karlene Bower MD,  Darlene Muir MD, Yanna Abdi MD, Kim Currie, DO, Charley Grewal MD,  Mynor Wu MD, Diego Braun MD, Vanita Weber, DO, Deirdre Griggs MD,  Vlad Young, DO, Po Campos MD, Opal Childers MD, Dony Bennett MD, Arvind Ye MD,  Cindi Temple MD, Leeanne Kapoor MD, Rabon Kocher, DO, Saroj Rodas MD,  Aleida Carranza MD, Mandie Curry, Jaime Litten, CNP, Hayley Vázquez, CNP, Joanne Kaminski, CNP,  Mundo Merchant, [facility], Marlene Cole, CNP, Davis James, CNP, Wanda Vázquez, CNP, Erlinda Zepeda, CNP, Anaid Pizano, CNP, Claudy Galindo PACariC, Fely Varghese, CNS, Leticia Stanley, CNP, Shanda Beyer, [facility]    Progress Note    7/20/2023    8:29 AM    Name:   Cindy Gong  MRN:     [id number]     Acct:      [de-identified]   Room:   2010/2010-02  IP Day:  2  Admit Date:  7/18/2023 12:16 PM    PCP:   Elliott Ng MD  Code Status:  Full Code    Subjective:     C/C:   Chief Complaint   Patient presents with    Wound Infection     Patient arrived via EMS from [address] [place] [address] to be evaluated by podiatry. Patient has a right foot infection with a hx of osteomyelitis. Interval History Status: improved. Patient is resting comfortably and denies any complaints of chest pain, shortness of breath, nausea vomiting, fever chills or acute complaints. Scheduled for surgical I&D tomorrow    Brief History: This is an 79-year-old female that presents with a complaint of right foot wound. She apparently is been residing at the care facility is been bedbound and developed a heel wound and there is now concern for infection and possible osteomyelitis.     Review of Systems: medial talar dome measuring approximately 1.3 x 1.4 cm with small amount of undermining fluid signal present. Unstable fragment cannot be excluded. 3. 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 4. Evidence for remote sprain of the intact lateral collateral ligament complex. Physical Examination:        General appearance:  alert, cooperative and no distress  Mental Status:  oriented to person, place and time and normal affect  Lungs:  clear to auscultation bilaterally, normal effort  Heart:  regular rate and rhythm, no murmur  Abdomen:  soft, nontender, nondistended, normal bowel sounds, no masses, hepatomegaly, splenomegaly  Extremities:  no edema, redness, tenderness in the calves  Skin:  no gross lesions, rashes, induration    Assessment:        Hospital Problems             Last Modified POA    * (Principal) Right foot infection 7/18/2023 Yes    Chronic deep vein thrombosis (DVT) of femoral vein of left lower extremity ([address]) 7/18/2023 Yes    Overview Signed 7/18/2023  4:44 PM by KILO Coronel CNP     Last Assessment & Plan:   Formatting of this note might be different from the original.  No signs of bleeding. Continue with apixaban. Generalized weakness 7/18/2023 Yes    Overview Signed 7/18/2023  4:44 PM by KILO Coronel CNP     Last Assessment & Plan:   Formatting of this note might be different from the original.  Could be related to sepsis. Physical and Occupational Therapy evaluated the patient and recommended skilled nursing.            Benign essential hypertension 7/19/2023 Yes    ALANA (obstructive sleep apnea) 7/19/2023 Yes    Venous insufficiency 7/19/2023 Yes    On continuous oral anticoagulation 7/19/2023 Yes       Plan:        Cefepime and Vanco as ordered and cultures  Home CPAP  Monitor and control blood pressure, no change in current medications  Hold Eliquis for upcoming surgery  PT and OT  GI DVT

## 2023-07-21 ENCOUNTER — ANESTHESIA EVENT (OUTPATIENT)
Dept: OPERATING ROOM | Age: 83
End: 2023-07-21
Payer: MEDICARE

## 2023-07-21 ENCOUNTER — APPOINTMENT (OUTPATIENT)
Dept: GENERAL RADIOLOGY | Age: 83
DRG: 504 | End: 2023-07-21
Payer: MEDICARE

## 2023-07-21 ENCOUNTER — ANESTHESIA (OUTPATIENT)
Dept: OPERATING ROOM | Age: 83
End: 2023-07-21
Payer: MEDICARE

## 2023-07-21 LAB
ALBUMIN SERPL-MCNC: 2.8 G/DL (ref 3.5–5.2)
ALP SERPL-CCNC: 61 U/L (ref 35–104)
ALT SERPL-CCNC: 12 U/L (ref 5–33)
ANION GAP SERPL CALCULATED.3IONS-SCNC: 9 MMOL/L (ref 9–17)
AST SERPL-CCNC: 19 U/L
BASOPHILS # BLD: 0.05 K/UL (ref 0–0.2)
BASOPHILS NFR BLD: 1 % (ref 0–2)
BILIRUB SERPL-MCNC: 0.3 MG/DL (ref 0.3–1.2)
BUN SERPL-MCNC: 10 MG/DL (ref 8–23)
BUN/CREAT SERPL: 17 (ref 9–20)
CALCIUM SERPL-MCNC: 8.3 MG/DL (ref 8.6–10.4)
CHLORIDE SERPL-SCNC: 108 MMOL/L (ref 98–107)
CO2 SERPL-SCNC: 24 MMOL/L (ref 20–31)
CREAT SERPL-MCNC: 0.6 MG/DL (ref 0.5–0.9)
EOSINOPHIL # BLD: 0.54 K/UL (ref 0–0.44)
EOSINOPHILS RELATIVE PERCENT: 7 % (ref 1–4)
ERYTHROCYTE [DISTWIDTH] IN BLOOD BY AUTOMATED COUNT: 15.8 % (ref 11.8–14.4)
GFR SERPL CREATININE-BSD FRML MDRD: >60 ML/MIN/1.73M2
GLUCOSE SERPL-MCNC: 85 MG/DL (ref 70–99)
HCT VFR BLD AUTO: 35 % (ref 36.3–47.1)
HGB BLD-MCNC: 11 G/DL (ref 11.9–15.1)
IMM GRANULOCYTES # BLD AUTO: 0.03 K/UL (ref 0–0.3)
IMM GRANULOCYTES NFR BLD: 0 %
LYMPHOCYTES NFR BLD: 2.42 K/UL (ref 1.1–3.7)
LYMPHOCYTES RELATIVE PERCENT: 29 % (ref 24–43)
MCH RBC QN AUTO: 28.9 PG (ref 25.2–33.5)
MCHC RBC AUTO-ENTMCNC: 31.4 G/DL (ref 28.4–34.8)
MCV RBC AUTO: 92.1 FL (ref 82.6–102.9)
MONOCYTES NFR BLD: 0.64 K/UL (ref 0.1–1.2)
MONOCYTES NFR BLD: 8 % (ref 3–12)
NEUTROPHILS NFR BLD: 55 % (ref 36–65)
NEUTS SEG NFR BLD: 4.59 K/UL (ref 1.5–8.1)
NRBC BLD-RTO: 0 PER 100 WBC
PLATELET # BLD AUTO: 245 K/UL (ref 138–453)
PMV BLD AUTO: 10.4 FL (ref 8.1–13.5)
POTASSIUM SERPL-SCNC: 3.6 MMOL/L (ref 3.7–5.3)
PROT SERPL-MCNC: 5.9 G/DL (ref 6.4–8.3)
RBC # BLD AUTO: 3.8 M/UL (ref 3.95–5.11)
RBC # BLD: ABNORMAL 10*6/UL
SODIUM SERPL-SCNC: 141 MMOL/L (ref 135–144)
WBC OTHER # BLD: 8.3 K/UL (ref 3.5–11.3)

## 2023-07-21 PROCEDURE — 2500000003 HC RX 250 WO HCPCS: Performed by: STUDENT IN AN ORGANIZED HEALTH CARE EDUCATION/TRAINING PROGRAM

## 2023-07-21 PROCEDURE — 88311 DECALCIFY TISSUE: CPT

## 2023-07-21 PROCEDURE — 6360000002 HC RX W HCPCS

## 2023-07-21 PROCEDURE — 6360000002 HC RX W HCPCS: Performed by: NURSE ANESTHETIST, CERTIFIED REGISTERED

## 2023-07-21 PROCEDURE — 87186 SC STD MICRODIL/AGAR DIL: CPT

## 2023-07-21 PROCEDURE — 3600000002 HC SURGERY LEVEL 2 BASE: Performed by: STUDENT IN AN ORGANIZED HEALTH CARE EDUCATION/TRAINING PROGRAM

## 2023-07-21 PROCEDURE — 88305 TISSUE EXAM BY PATHOLOGIST: CPT

## 2023-07-21 PROCEDURE — 0QBL0ZZ EXCISION OF RIGHT TARSAL, OPEN APPROACH: ICD-10-PCS

## 2023-07-21 PROCEDURE — 3700000000 HC ANESTHESIA ATTENDED CARE: Performed by: STUDENT IN AN ORGANIZED HEALTH CARE EDUCATION/TRAINING PROGRAM

## 2023-07-21 PROCEDURE — 3700000001 HC ADD 15 MINUTES (ANESTHESIA): Performed by: STUDENT IN AN ORGANIZED HEALTH CARE EDUCATION/TRAINING PROGRAM

## 2023-07-21 PROCEDURE — 2580000003 HC RX 258: Performed by: NURSE PRACTITIONER

## 2023-07-21 PROCEDURE — 7100000000 HC PACU RECOVERY - FIRST 15 MIN: Performed by: STUDENT IN AN ORGANIZED HEALTH CARE EDUCATION/TRAINING PROGRAM

## 2023-07-21 PROCEDURE — 0JXQ0ZZ TRANSFER RIGHT FOOT SUBCUTANEOUS TISSUE AND FASCIA, OPEN APPROACH: ICD-10-PCS

## 2023-07-21 PROCEDURE — 87070 CULTURE OTHR SPECIMN AEROBIC: CPT

## 2023-07-21 PROCEDURE — 6360000002 HC RX W HCPCS: Performed by: NURSE PRACTITIONER

## 2023-07-21 PROCEDURE — 36415 COLL VENOUS BLD VENIPUNCTURE: CPT

## 2023-07-21 PROCEDURE — 2709999900 HC NON-CHARGEABLE SUPPLY: Performed by: STUDENT IN AN ORGANIZED HEALTH CARE EDUCATION/TRAINING PROGRAM

## 2023-07-21 PROCEDURE — 87205 SMEAR GRAM STAIN: CPT

## 2023-07-21 PROCEDURE — 85027 COMPLETE CBC AUTOMATED: CPT

## 2023-07-21 PROCEDURE — 87075 CULTR BACTERIA EXCEPT BLOOD: CPT

## 2023-07-21 PROCEDURE — 3E0T3BZ INTRODUCTION OF ANESTHETIC AGENT INTO PERIPHERAL NERVES AND PLEXI, PERCUTANEOUS APPROACH: ICD-10-PCS | Performed by: ANESTHESIOLOGY

## 2023-07-21 PROCEDURE — 2580000003 HC RX 258: Performed by: NURSE ANESTHETIST, CERTIFIED REGISTERED

## 2023-07-21 PROCEDURE — 1200000000 HC SEMI PRIVATE

## 2023-07-21 PROCEDURE — 86403 PARTICLE AGGLUT ANTBDY SCRN: CPT

## 2023-07-21 PROCEDURE — 64445 NJX AA&/STRD SCIATIC NRV IMG: CPT | Performed by: ANESTHESIOLOGY

## 2023-07-21 PROCEDURE — 6360000002 HC RX W HCPCS: Performed by: ANESTHESIOLOGY

## 2023-07-21 PROCEDURE — 2500000003 HC RX 250 WO HCPCS: Performed by: NURSE ANESTHETIST, CERTIFIED REGISTERED

## 2023-07-21 PROCEDURE — 2500000003 HC RX 250 WO HCPCS: Performed by: NURSE PRACTITIONER

## 2023-07-21 PROCEDURE — C9399 UNCLASSIFIED DRUGS OR BIOLOG: HCPCS | Performed by: ANESTHESIOLOGY

## 2023-07-21 PROCEDURE — 3600000012 HC SURGERY LEVEL 2 ADDTL 15MIN: Performed by: STUDENT IN AN ORGANIZED HEALTH CARE EDUCATION/TRAINING PROGRAM

## 2023-07-21 PROCEDURE — 99232 SBSQ HOSP IP/OBS MODERATE 35: CPT | Performed by: NURSE PRACTITIONER

## 2023-07-21 PROCEDURE — 7100000001 HC PACU RECOVERY - ADDTL 15 MIN: Performed by: STUDENT IN AN ORGANIZED HEALTH CARE EDUCATION/TRAINING PROGRAM

## 2023-07-21 PROCEDURE — 80053 COMPREHEN METABOLIC PANEL: CPT

## 2023-07-21 DEVICE — DRESSING WND MICRONIZED PARTIC 500 MG MATRISTEM MICROMATRIX: Type: IMPLANTABLE DEVICE | Site: HEEL | Status: FUNCTIONAL

## 2023-07-21 RX ORDER — SODIUM CHLORIDE 0.9 % (FLUSH) 0.9 %
5-40 SYRINGE (ML) INJECTION EVERY 12 HOURS SCHEDULED
Status: DISCONTINUED | OUTPATIENT
Start: 2023-07-21 | End: 2023-07-21 | Stop reason: HOSPADM

## 2023-07-21 RX ORDER — LIDOCAINE HYDROCHLORIDE 20 MG/ML
INJECTION, SOLUTION EPIDURAL; INFILTRATION; INTRACAUDAL; PERINEURAL PRN
Status: DISCONTINUED | OUTPATIENT
Start: 2023-07-21 | End: 2023-07-21 | Stop reason: SDUPTHER

## 2023-07-21 RX ORDER — FENTANYL CITRATE 50 UG/ML
25 INJECTION, SOLUTION INTRAMUSCULAR; INTRAVENOUS EVERY 5 MIN PRN
Status: DISCONTINUED | OUTPATIENT
Start: 2023-07-21 | End: 2023-07-21 | Stop reason: HOSPADM

## 2023-07-21 RX ORDER — POLYETHYLENE GLYCOL 3350 17 G/17G
17 POWDER, FOR SOLUTION ORAL DAILY
COMMUNITY

## 2023-07-21 RX ORDER — BUPIVACAINE HYDROCHLORIDE AND EPINEPHRINE 5; 5 MG/ML; UG/ML
INJECTION, SOLUTION EPIDURAL; INTRACAUDAL; PERINEURAL PRN
Status: DISCONTINUED | OUTPATIENT
Start: 2023-07-21 | End: 2023-07-21 | Stop reason: ALTCHOICE

## 2023-07-21 RX ORDER — ROCURONIUM BROMIDE 10 MG/ML
INJECTION, SOLUTION INTRAVENOUS PRN
Status: DISCONTINUED | OUTPATIENT
Start: 2023-07-21 | End: 2023-07-21 | Stop reason: SDUPTHER

## 2023-07-21 RX ORDER — PROPOFOL 10 MG/ML
INJECTION, EMULSION INTRAVENOUS PRN
Status: DISCONTINUED | OUTPATIENT
Start: 2023-07-21 | End: 2023-07-21 | Stop reason: SDUPTHER

## 2023-07-21 RX ORDER — BISACODYL 10 MG
10 SUPPOSITORY, RECTAL RECTAL DAILY PRN
COMMUNITY

## 2023-07-21 RX ORDER — HYDROCODONE BITARTRATE AND ACETAMINOPHEN 7.5; 325 MG/1; MG/1
1 TABLET ORAL EVERY 6 HOURS PRN
Status: ON HOLD | COMMUNITY
End: 2023-07-24 | Stop reason: HOSPADM

## 2023-07-21 RX ORDER — ONDANSETRON 2 MG/ML
INJECTION INTRAMUSCULAR; INTRAVENOUS PRN
Status: DISCONTINUED | OUTPATIENT
Start: 2023-07-21 | End: 2023-07-21 | Stop reason: SDUPTHER

## 2023-07-21 RX ORDER — HYDROMORPHONE HYDROCHLORIDE 1 MG/ML
0.5 INJECTION, SOLUTION INTRAMUSCULAR; INTRAVENOUS; SUBCUTANEOUS EVERY 5 MIN PRN
Status: DISCONTINUED | OUTPATIENT
Start: 2023-07-21 | End: 2023-07-21 | Stop reason: HOSPADM

## 2023-07-21 RX ORDER — ACETAMINOPHEN 500 MG
500 TABLET ORAL EVERY 8 HOURS PRN
COMMUNITY

## 2023-07-21 RX ORDER — ONDANSETRON 2 MG/ML
4 INJECTION INTRAMUSCULAR; INTRAVENOUS
Status: DISCONTINUED | OUTPATIENT
Start: 2023-07-21 | End: 2023-07-21 | Stop reason: HOSPADM

## 2023-07-21 RX ORDER — SODIUM CHLORIDE 9 MG/ML
INJECTION, SOLUTION INTRAVENOUS PRN
Status: DISCONTINUED | OUTPATIENT
Start: 2023-07-21 | End: 2023-07-21 | Stop reason: HOSPADM

## 2023-07-21 RX ORDER — BUPIVACAINE HYDROCHLORIDE AND EPINEPHRINE 5; 5 MG/ML; UG/ML
INJECTION, SOLUTION EPIDURAL; INTRACAUDAL; PERINEURAL
Status: DISPENSED
Start: 2023-07-21 | End: 2023-07-22

## 2023-07-21 RX ORDER — SODIUM CHLORIDE, SODIUM LACTATE, POTASSIUM CHLORIDE, CALCIUM CHLORIDE 600; 310; 30; 20 MG/100ML; MG/100ML; MG/100ML; MG/100ML
INJECTION, SOLUTION INTRAVENOUS CONTINUOUS PRN
Status: DISCONTINUED | OUTPATIENT
Start: 2023-07-21 | End: 2023-07-21 | Stop reason: SDUPTHER

## 2023-07-21 RX ORDER — FENTANYL CITRATE 50 UG/ML
INJECTION, SOLUTION INTRAMUSCULAR; INTRAVENOUS PRN
Status: DISCONTINUED | OUTPATIENT
Start: 2023-07-21 | End: 2023-07-21 | Stop reason: SDUPTHER

## 2023-07-21 RX ORDER — SODIUM CHLORIDE 0.9 % (FLUSH) 0.9 %
5-40 SYRINGE (ML) INJECTION PRN
Status: DISCONTINUED | OUTPATIENT
Start: 2023-07-21 | End: 2023-07-21 | Stop reason: HOSPADM

## 2023-07-21 RX ORDER — BUPIVACAINE HYDROCHLORIDE 5 MG/ML
INJECTION, SOLUTION EPIDURAL; INTRACAUDAL
Status: COMPLETED | OUTPATIENT
Start: 2023-07-21 | End: 2023-07-21

## 2023-07-21 RX ORDER — DEXAMETHASONE SODIUM PHOSPHATE 10 MG/ML
INJECTION, SOLUTION INTRAMUSCULAR; INTRAVENOUS PRN
Status: DISCONTINUED | OUTPATIENT
Start: 2023-07-21 | End: 2023-07-21 | Stop reason: SDUPTHER

## 2023-07-21 RX ORDER — GLYCOPYRROLATE 0.2 MG/ML
INJECTION INTRAMUSCULAR; INTRAVENOUS PRN
Status: DISCONTINUED | OUTPATIENT
Start: 2023-07-21 | End: 2023-07-21 | Stop reason: SDUPTHER

## 2023-07-21 RX ADMIN — CLINDAMYCIN IN 5 PERCENT DEXTROSE 600 MG: 12 INJECTION, SOLUTION INTRAVENOUS at 16:22

## 2023-07-21 RX ADMIN — SODIUM CHLORIDE, POTASSIUM CHLORIDE, SODIUM LACTATE AND CALCIUM CHLORIDE: 600; 310; 30; 20 INJECTION, SOLUTION INTRAVENOUS at 16:07

## 2023-07-21 RX ADMIN — CEFEPIME 2000 MG: 2 INJECTION, POWDER, FOR SOLUTION INTRAVENOUS at 08:35

## 2023-07-21 RX ADMIN — FENTANYL CITRATE 50 MCG: 50 INJECTION INTRAMUSCULAR; INTRAVENOUS at 16:15

## 2023-07-21 RX ADMIN — PROPOFOL 170 MG: 10 INJECTION, EMULSION INTRAVENOUS at 16:15

## 2023-07-21 RX ADMIN — BUPIVACAINE HYDROCHLORIDE 25 ML: 5 INJECTION, SOLUTION EPIDURAL; INTRACAUDAL at 14:52

## 2023-07-21 RX ADMIN — SUGAMMADEX 200 MG: 100 INJECTION, SOLUTION INTRAVENOUS at 18:13

## 2023-07-21 RX ADMIN — ROCURONIUM BROMIDE 40 MG: 10 INJECTION, SOLUTION INTRAVENOUS at 16:15

## 2023-07-21 RX ADMIN — CLINDAMYCIN IN 5 PERCENT DEXTROSE 600 MG: 12 INJECTION, SOLUTION INTRAVENOUS at 08:38

## 2023-07-21 RX ADMIN — SODIUM CHLORIDE: 9 INJECTION, SOLUTION INTRAVENOUS at 05:48

## 2023-07-21 RX ADMIN — ROCURONIUM BROMIDE 10 MG: 10 INJECTION, SOLUTION INTRAVENOUS at 16:53

## 2023-07-21 RX ADMIN — METOPROLOL TARTRATE 5 MG: 5 INJECTION INTRAVENOUS at 12:57

## 2023-07-21 RX ADMIN — FENTANYL CITRATE 25 MCG: 50 INJECTION INTRAMUSCULAR; INTRAVENOUS at 18:17

## 2023-07-21 RX ADMIN — ONDANSETRON 4 MG: 2 INJECTION INTRAMUSCULAR; INTRAVENOUS at 18:11

## 2023-07-21 RX ADMIN — FENTANYL CITRATE 50 MCG: 50 INJECTION INTRAMUSCULAR; INTRAVENOUS at 18:11

## 2023-07-21 RX ADMIN — CLINDAMYCIN IN 5 PERCENT DEXTROSE 600 MG: 12 INJECTION, SOLUTION INTRAVENOUS at 02:39

## 2023-07-21 RX ADMIN — LIDOCAINE HYDROCHLORIDE 100 MG: 20 INJECTION, SOLUTION EPIDURAL; INFILTRATION; INTRACAUDAL; PERINEURAL at 16:15

## 2023-07-21 RX ADMIN — DEXAMETHASONE SODIUM PHOSPHATE 10 MG: 10 INJECTION, SOLUTION INTRAMUSCULAR; INTRAVENOUS at 16:49

## 2023-07-21 RX ADMIN — FENTANYL CITRATE 25 MCG: 50 INJECTION INTRAMUSCULAR; INTRAVENOUS at 18:06

## 2023-07-21 RX ADMIN — GLYCOPYRROLATE 0.2 MG: 0.2 INJECTION INTRAMUSCULAR; INTRAVENOUS at 16:45

## 2023-07-21 RX ADMIN — ONDANSETRON 4 MG: 2 INJECTION INTRAMUSCULAR; INTRAVENOUS at 18:37

## 2023-07-21 RX ADMIN — FENTANYL CITRATE 50 MCG: 50 INJECTION INTRAMUSCULAR; INTRAVENOUS at 16:53

## 2023-07-21 ASSESSMENT — PAIN DESCRIPTION - DESCRIPTORS
DESCRIPTORS: SORE
DESCRIPTORS: SORE
DESCRIPTORS: ACHING

## 2023-07-21 ASSESSMENT — PAIN DESCRIPTION - LOCATION
LOCATION: SHOULDER

## 2023-07-21 ASSESSMENT — PAIN SCALES - GENERAL
PAINLEVEL_OUTOF10: 6
PAINLEVEL_OUTOF10: 6
PAINLEVEL_OUTOF10: 7

## 2023-07-21 ASSESSMENT — PAIN DESCRIPTION - PAIN TYPE: TYPE: ACUTE PAIN

## 2023-07-21 ASSESSMENT — PAIN DESCRIPTION - ONSET: ONSET: ON-GOING

## 2023-07-21 ASSESSMENT — PAIN DESCRIPTION - ORIENTATION
ORIENTATION: RIGHT

## 2023-07-21 ASSESSMENT — PAIN DESCRIPTION - FREQUENCY: FREQUENCY: CONTINUOUS

## 2023-07-21 NOTE — CARE COORDINATION
Social work: [facility] is a [facility] where pt was prior rto admission. [facility]: [phone]  Fax weekends [phone]  FAx weekdays [phone]    Sayra Sidhu in admissions will take pt back  however if they are short staff on weekend they may not be able to take her until Monday morning. Will fax updates. Pr may need long term care and had only 8 medicare days left when she arrived at [facility]. Will need megha, Rx and discharge order for snf.  Andra fu

## 2023-07-21 NOTE — PROGRESS NOTES
Progress Note  Podiatric Medicine and Surgery     Subjective     CC: right heel wound    -Patient was seen and examined at bedside during rounding this morning  - NPO since midnight  - Expresses concern over losing \" part of my heel bone\", however understands the necessity for debridement of infection  -Patient denies any systemic signs of infection including nausea, vomiting, fever, chills, diarrhea, shortness of breath or chest pain at this time      HPI :  Tamika Amaro is a 79 y.o. female seen at [facility] ED for nonhealing right heel wound and possible concern for infection. Patient was evaluated by podiatrist from [place], who advised her to present to ER for concern for worsening wound infection. Patient states she has had this heel wound for quite some time, not currently on antibiotics. Patient reports wound is currently tender to touch, denies any changes in activity or trauma. Patient currently resides at an outside facility, reports she is not very active. Denies any other pedal complaints at this time. PCP is Karen Cohen MD    ROS: Review of Systems   Constitutional:  Negative for chills and fever. Respiratory:  Negative for chest tightness and shortness of breath. Cardiovascular:  Negative for chest pain and leg swelling. Gastrointestinal:  Negative for abdominal pain, constipation, diarrhea, nausea and vomiting. Musculoskeletal:  Positive for gait problem and myalgias. Negative for arthralgias and joint swelling. Skin:  Positive for color change and wound. Neurological:  Negative for weakness and numbness.      Medications:  Scheduled Meds:   sodium chloride flush  5-40 mL IntraVENous 2 times per day    enoxaparin  40 mg SubCUTAneous Daily    [Held by provider] apixaban  5 mg Oral BID    polyethylene glycol  17 g Oral Daily    sennosides-docusate sodium  2 tablet Oral Daily    cefepime  2,000 mg IntraVENous Q12H    clindamycin (CLEOCIN) IV  600 mg IntraVENous Q6H

## 2023-07-21 NOTE — FLOWSHEET NOTE
07/21/23 1426   Mobility   Location Change/Off Floor Surgery   Transport Method Bed     Patient stable for transport. Antibiotic taken to preop area.

## 2023-07-21 NOTE — PROGRESS NOTES
Occupational Therapy  DATE: 2023    NAME: Heriberto Floyd  MRN: [id number]   : 1940    Patient not seen this date for Occupational Therapy due to:      [] Cancel by RN or physician due to:    [] Hemodialysis    [] Critical Lab Value Level     [] Blood transfusion in progress    [] Acute or unstable cardiovascular status   _MAP < 55 or more than >115  _HR < 40 or > 130    [] Acute or unstable pulmonary status   -FiO2 > 60%   _RR < 5 or >40    _O2 sats < 85%    [] Strict Bedrest    [x] Off Unit for surgery or procedure (Pt off unit for sx at 1500)    [] Off Unit for testing       [] Pending imaging to R/O fracture    [] Refusal by Patient      [] Intubated    [] Other      [] OT being discontinued at this time. Patient independent. No further needs. [] OT being discontinued at this time as the patient has been transferred to hospice care. No further needs.       Gar Jose, OTR/L

## 2023-07-21 NOTE — BRIEF OP NOTE
PODIATRY BRIEF OP NOTE    PATIENT NAME: Leonard Castañeda  YOB: 1940  -  79 y.o. female  MRN: [id number]  DATE: 7/21/2023  BILLING #: [id number]    Surgeon(s):  Bianca Davey DPM     ASSISTANTS: Demetri Calvo DPM PGY3; John Santana PGY1    PRE-OP DIAGNOSIS:   Decubitus ulcer down to bone, right heel  Osteomyelitis, right heel  Cellulitis, right heel    POST-OP DIAGNOSIS: Same as above. PROCEDURE:   Partial calcanectomy; Right LE  Tendon transfer; RLE  Adjacent soft tissue transfer (12cm x 3.2cm)    ANESTHESIA: General    HEMOSTASIS: Pneumatic tourniquet to right Thigh @ 300 mmHg for 60 minutes. ESTIMATED BLOOD LOSS: Less than 50cc. MATERIALS:   Implant Name Type Inv. Item Serial No.  Lot No. LRB No. Used Action   DRESSING WND MICRONIZED PARTIC 500 MG MATRISTEM MICROMATRIX - [id number]  DRESSING WND MICRONIZED PARTIC 500 MG MATRISTEM MICROMATRIX [id number] [employer]-  Right 1 Implanted       INJECTABLES:   0.5% marcaine pl 10cc intra op    SPECIMEN:   ID Type Source Tests Collected by Time Destination   1 : RIGHT HEEL TISSUE Tissue Foot CULTURE, ANAEROBIC AND AEROBIC Bianca Davey, [facility] 7/21/2023 1659    2 : RIGHT CALCANEUS BONE Bone Foot CULTURE, ANAEROBIC AND AEROBIC Bianca Davey, [facility] 7/21/2023 1708    A : RIGHT CALCANEUS BONE Bone Foot SURGICAL PATHOLOGY Bianca Davey, [facility] 7/21/2023 1710    B : CLEARANCE FRAGMENT CALCANEUS Bone Foot SURGICAL PATHOLOGY Bianca Davey, [facility] 7/21/2023 8131        COMPLICATIONS: none    FINDINGS:  Please see op note for full detail. The patient was counseled at length about the risks of vaibhav Covid-19 during their perioperative period and any recovery window from their procedure. The patient was made aware that vaibhav Covid-19  may worsen their prognosis for recovering from their procedure  and lend to a higher morbidity and/or mortality risk.   All material risks, benefits, and reasonable alternatives including

## 2023-07-21 NOTE — ANESTHESIA PROCEDURE NOTES
Peripheral Block    Patient location during procedure: pre-op  Reason for block: procedure for pain, post-op pain management and at surgeon's request  Start time: 7/21/2023 2:52 PM  End time: 7/21/2023 3:00 PM  Staffing  Performed: anesthesiologist   Anesthesiologist: Henri Dick MD  Preanesthetic Checklist  Completed: patient identified, IV checked, site marked, risks and benefits discussed, surgical/procedural consents, equipment checked, pre-op evaluation, timeout performed, anesthesia consent given, oxygen available, monitors applied/VS acknowledged, fire risk safety assessment completed and verbalized and blood product R/B/A discussed and consented  Peripheral Block   Patient position: supine  Prep: ChloraPrep  Provider prep: mask and sterile gloves  Patient monitoring: cardiac monitor, continuous pulse ox, continuous capnometry, frequent blood pressure checks, IV access, oxygen and responsive to questions  Block type: Sciatic  Popliteal  Laterality: right  Injection technique: single-shot  Guidance: ultrasound guided  Local infiltration: lidocaine  Infiltration strength: 1 %  Local infiltration: lidocaine  Dose: 3 mL    Needle   Needle type: pencil-tip   Needle gauge: 20 G  Needle localization: ultrasound guidance  Assessment   Injection assessment: negative aspiration for heme, no paresthesia on injection, local visualized surrounding nerve on ultrasound and no intravascular symptoms  Paresthesia pain: none  Slow fractionated injection: yes  Hemodynamics: stable  Real-time US image taken/store: yes  Outcomes: patient tolerated procedure well and uncomplicated    Additional Notes  Preprocedure ready time 8942  Medications Administered  bupivacaine (MARCAINE) PF injection 0.5% - Perineural   25 mL - 7/21/2023 2:52:00 PM

## 2023-07-21 NOTE — PROGRESS NOTES
Patient continues to refuse podus boot to left foot, elevated on pillow to minimize pressure to heel, tolerates well

## 2023-07-21 NOTE — PROGRESS NOTES
Transitions of Care Pharmacy Service   Medication Review    The patient's list of current home medications has been reviewed. Source(s) of information: Medication summary from [facility] (dated 7/21/23)    Based on information provided by the above source(s), I have updated the patient's home med list as described below. I changed or updated the following medications on the patient's home medication list:  Removed [DISCONTINUED] warfarin (COUMADIN) 5 MG tablet, Take 1 tablet by mouth daily   Added bisacodyl (DULCOLAX) 10 MG suppository, Place 1 suppository rectally daily as needed for Constipation  apixaban (ELIQUIS) 5 MG TABS tablet, Take 1 tablet by mouth 2 times daily  HYDROcodone-acetaminophen (NORCO) 7.5-325 MG per tablet, Take 1 tablet by mouth every 6 hours as needed for Pain. Max Daily Amount: 4 tablets  polyethylene glycol (GLYCOLAX) 17 GM/SCOOP powder, Take 17 g by mouth daily  acetaminophen (TYLENOL) 500 MG tablet, Take 1 tablet by mouth every 8 hours as needed for Pain   Adjusted   senna-docusate (PERICOLACE) 8.6-50 MG per tablet, Take 2 tablets by mouth nightly as needed for Constipation (for use with narcotic medications)     PROVIDER ACTION REQUESTED  Medications that need to be addressed by a physician/nurse practitioner:    Action Requested   N/A       Please feel free to call me with any questions about this encounter. Thank you. Kurt Mercado [facility]   Transitions of Care Pharmacy Service  Phone:  [phone]  Fax: [phone]      Electronically signed by Kurt Mercado [facility] on 7/21/2023 at 2:35 PM     Medications Prior to Admission:   bisacodyl (DULCOLAX) 10 MG suppository, Place 1 suppository rectally daily as needed for Constipation  apixaban (ELIQUIS) 5 MG TABS tablet, Take 1 tablet by mouth 2 times daily  HYDROcodone-acetaminophen (NORCO) 7.5-325 MG per tablet, Take 1 tablet by mouth every 6 hours as needed for Pain.  Max Daily Amount: 4 tablets  polyethylene glycol (GLYCOLAX) 17 GM/SCOOP powder, Take 17 g by mouth daily  acetaminophen (TYLENOL) 500 MG tablet, Take 1 tablet by mouth every 8 hours as needed for Pain  piperacillin-tazobactam (ZOSYN) 4-0.5 GM per 100ML IVPB, Infuse 100 mLs intravenously every 6 hours  senna-docusate (PERICOLACE) 8.6-50 MG per tablet, Take 2 tablets by mouth nightly as needed for Constipation (for use with narcotic medications)

## 2023-07-21 NOTE — ANESTHESIA PRE PROCEDURE
GLUCOSE 85 07/21/2023 06:06 AM    PROT 5.9 07/21/2023 06:06 AM    CALCIUM 8.3 07/21/2023 06:06 AM    BILITOT 0.3 07/21/2023 06:06 AM    ALKPHOS 61 07/21/2023 06:06 AM    AST 19 07/21/2023 06:06 AM    ALT 12 07/21/2023 06:06 AM       POC Tests: No results for input(s): POCGLU, POCNA, POCK, POCCL, POCBUN, POCHEMO, POCHCT in the last 72 hours. Coags: No results found for: PROTIME, INR, APTT    HCG (If Applicable): No results found for: PREGTESTUR, PREGSERUM, HCG, HCGQUANT     ABGs: No results found for: PHART, PO2ART, RYL7IAU, TLR8YVR, BEART, B6BSSMLB     Type & Screen (If Applicable):  No results found for: LABABO, LABRH    Drug/Infectious Status (If Applicable):  No results found for: HIV, HEPCAB    COVID-19 Screening (If Applicable): No results found for: COVID19        Anesthesia Evaluation   no history of anesthetic complications:   Airway: Mallampati: III     Neck ROM: full  Mouth opening: < 3 FB   Dental:          Pulmonary:normal exam    (+) sleep apnea:                             Cardiovascular:    (+) hypertension:,     (-)  angina                Neuro/Psych:   (+) neuromuscular disease:,             GI/Hepatic/Renal:   (+) morbid obesity          Endo/Other:    (+) blood dyscrasia: anemia:., .                 Abdominal:             Vascular:   + DVT, . Other Findings:           Anesthesia Plan      general     ASA 3     (Ett)  Induction: intravenous. MIPS: Postoperative opioids intended and Prophylactic antiemetics administered. Anesthetic plan and risks discussed with patient.         Attending anesthesiologist reviewed and agrees with Preprocedure content                Grecia Bellamy MD   7/21/2023

## 2023-07-21 NOTE — PLAN OF CARE
Problem: Discharge Planning  Goal: Discharge to home or other facility with appropriate resources  7/20/2023 2217 by Yusef Navarro RN  Outcome: Progressing  7/20/2023 1802 by Marylu Gandhi RN  Outcome: Progressing  Flowsheets (Taken 7/20/2023 0900)  Discharge to home or other facility with appropriate resources:   Identify barriers to discharge with patient and caregiver   Identify discharge learning needs (meds, wound care, etc)   Arrange for needed discharge resources and transportation as appropriate   Refer to discharge planning if patient needs post-hospital services based on physician order or complex needs related to functional status, cognitive ability or social support system

## 2023-07-21 NOTE — PROGRESS NOTES
[facility]  Office: [id number]  1826, DO, Som Pap, DO, Janice Otto, DO, Chucky Scott Blood, DO, Surjit Johnson MD, Sebastian Brush MD, Nelida Potts MD, Arcelia Libman, MD,  Tigre Castañeda MD, Rosario Rivera MD, Cleve Marrero, DO, Trevor Walters MD,  Alex Kelly MD, Kristie Goddard MD, Adonay Liu DO, Philomena Grant MD,  Karlos Pérez DO, Pritesh Leblanc MD, Alannah Beltre MD, Rubina Thomas MD, Bhumi Arce MD,  Joe Herrera MD, Kameron Dennis MD, Oksana Stone DO, Ida Crespo MD,  Jr Blake MD, Eldon Bejarano, Maddi Hunter, CNP, Leif Lazaro, CNP, Che Christian, CNP,  Amy Fitzpatrick, [facility], Ulices Heaton, CNP, Ivana Mendez, CNP, Shon Goodrich, CNP, Aaliyah Escobar, CNP, Mikey Ware, CNP, Virginia Rea PA-C, Yumi Mckeon, CNS, Alley Fowler, CNP, Noemy Moctezuma, [facility]    Progress Note    7/21/2023    11:23 AM    Name:   Esperanza Turpin  MRN:     [id number]     Acct:      [de-identified]   Room:   2010/2010-02  IP Day:  3  Admit Date:  7/18/2023 12:16 PM    PCP:   Julisa Ram MD  Code Status:  Full Code    Subjective:     C/C:   Chief Complaint   Patient presents with    Wound Infection     Patient arrived via EMS from [address] [place] [address] to be evaluated by podiatry. Patient has a right foot infection with a hx of osteomyelitis. Interval History Status: not changed. Condition is essentially unchanged. Plan for or later today per podiatry. Remains on broad-spectrum antibiotics. Anticoagulation has been held and she is on Lovenox currently. Will likely require reinitiation of long-term anticoagulation due to her chronic DVT once cleared by podiatry. Brief History: This is an 42-year-old female that presents with a complaint of right foot wound.   She apparently is been residing at the care facility is been bedbound and RESISTANT STAPHYLOCOCCUS AUREUS LIGHT GROWTH    CULTURE NORMAL SKIN CAITLYN 07/18/2023 02:40 PM    CULTURE No anaerobic organisms isolated at 3 days. 07/18/2023 02:40 PM       Radiology:  XR FOOT RIGHT (MIN 3 VIEWS)    Result Date: 7/18/2023  Diffuse soft tissue swelling and arthritic changes without acute osseous abnormality. Mineralization complicates assessment. RECOMMENDATION: Advise osteomyelitis is of concern would advise three-phase radionuclide imaging or MRI imaging. MRI ANKLE RIGHT WO CONTRAST    Result Date: 7/18/2023  1. Large dorsal ulceration at the heel with associated complex T2 heterogeneous focus along the inferior aspect which may reflect abscess versus phlegmon. This area measures 1.9 x 2.0 x 1.9 cm. Underlying osteomyelitis of the dorsal calcaneus. 2. Osteochondral lesion of the medial talar dome measuring approximately 1.3 x 1.4 cm with small amount of undermining fluid signal present. Unstable fragment cannot be excluded. 3. 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 4. Evidence for remote sprain of the intact lateral collateral ligament complex. Physical Examination:        General appearance:  alert, cooperative and no distress  Mental Status:  oriented to person, place and time and normal affect  Lungs:  clear to auscultation bilaterally, normal effort  Heart:  regular rate and rhythm, no murmur  Abdomen:  soft, nontender, nondistended, normal bowel sounds, no masses, hepatomegaly, splenomegaly  Extremities:  no edema, redness, tenderness in the calves   Both feet are in podiatry boots and dressed. These dressings are not broken down by this provider. Please refer to podiatry assessment. Skin:  no gross lesions, rashes, induration in the exposed tissue, please refer to podiatry assessment for feet assessment.     Assessment:        Hospital Problems             Last Modified POA    * (Principal) Right foot infection

## 2023-07-21 NOTE — PROGRESS NOTES
Physical Therapy  DATE: 2023    NAME: Mitra López  MRN: [id number]   : 1940    Patient not seen this date for Physical Therapy due to:      [] Cancel by RN or physician due to:    [] Hemodialysis    [] Critical Lab Value Level     [] Blood transfusion in progress    [] Acute or unstable cardiovascular status   _MAP < 55 or more than >115  _HR < 40 or > 130    [] Acute or unstable pulmonary status   -FiO2 > 60%   _RR < 5 or >40    _O2 sats < 85%    [] Strict Bedrest    [x] Off Unit for surgery or procedure (Pt off unit for I&D sx at 1500)    [] Off Unit for testing       [] Pending imaging to R/O fracture    [] Refusal by Patient      [] Other      [] PT being discontinued at this time. Patient independent. No further needs. [] PT being discontinued at this time as the patient has been transferred to hospice care. No further needs.       Tenzin Ek, PTA

## 2023-07-22 LAB
ALBUMIN SERPL-MCNC: 2.9 G/DL (ref 3.5–5.2)
ALP SERPL-CCNC: 66 U/L (ref 35–104)
ALT SERPL-CCNC: 14 U/L (ref 5–33)
ANION GAP SERPL CALCULATED.3IONS-SCNC: 9 MMOL/L (ref 9–17)
AST SERPL-CCNC: 21 U/L
BASOPHILS # BLD: <0.03 K/UL (ref 0–0.2)
BASOPHILS NFR BLD: 0 % (ref 0–2)
BILIRUB SERPL-MCNC: 0.3 MG/DL (ref 0.3–1.2)
BUN SERPL-MCNC: 10 MG/DL (ref 8–23)
BUN/CREAT SERPL: 20 (ref 9–20)
CALCIUM SERPL-MCNC: 8.4 MG/DL (ref 8.6–10.4)
CHLORIDE SERPL-SCNC: 104 MMOL/L (ref 98–107)
CO2 SERPL-SCNC: 24 MMOL/L (ref 20–31)
CREAT SERPL-MCNC: 0.5 MG/DL (ref 0.5–0.9)
CRP SERPL HS-MCNC: 6.6 MG/L (ref 0–5)
EOSINOPHIL # BLD: <0.03 K/UL (ref 0–0.44)
EOSINOPHILS RELATIVE PERCENT: 0 % (ref 1–4)
ERYTHROCYTE [DISTWIDTH] IN BLOOD BY AUTOMATED COUNT: 15.3 % (ref 11.8–14.4)
ERYTHROCYTE [SEDIMENTATION RATE] IN BLOOD BY PHOTOMETRIC METHOD: 62 MM/HR (ref 0–30)
GFR SERPL CREATININE-BSD FRML MDRD: >60 ML/MIN/1.73M2
GLUCOSE SERPL-MCNC: 112 MG/DL (ref 70–99)
HCT VFR BLD AUTO: 35 % (ref 36.3–47.1)
HGB BLD-MCNC: 11.2 G/DL (ref 11.9–15.1)
IMM GRANULOCYTES # BLD AUTO: 0.06 K/UL (ref 0–0.3)
IMM GRANULOCYTES NFR BLD: 1 %
LYMPHOCYTES NFR BLD: 1.46 K/UL (ref 1.1–3.7)
LYMPHOCYTES RELATIVE PERCENT: 14 % (ref 24–43)
MCH RBC QN AUTO: 28.9 PG (ref 25.2–33.5)
MCHC RBC AUTO-ENTMCNC: 32 G/DL (ref 28.4–34.8)
MCV RBC AUTO: 90.2 FL (ref 82.6–102.9)
MONOCYTES NFR BLD: 0.42 K/UL (ref 0.1–1.2)
MONOCYTES NFR BLD: 4 % (ref 3–12)
NEUTROPHILS NFR BLD: 81 % (ref 36–65)
NEUTS SEG NFR BLD: 8.57 K/UL (ref 1.5–8.1)
NRBC BLD-RTO: 0 PER 100 WBC
PLATELET # BLD AUTO: 247 K/UL (ref 138–453)
PMV BLD AUTO: 10.7 FL (ref 8.1–13.5)
POTASSIUM SERPL-SCNC: 4 MMOL/L (ref 3.7–5.3)
PROT SERPL-MCNC: 5.8 G/DL (ref 6.4–8.3)
RBC # BLD AUTO: 3.88 M/UL (ref 3.95–5.11)
RBC # BLD: ABNORMAL 10*6/UL
SODIUM SERPL-SCNC: 137 MMOL/L (ref 135–144)
WBC OTHER # BLD: 10.5 K/UL (ref 3.5–11.3)

## 2023-07-22 PROCEDURE — 2580000003 HC RX 258

## 2023-07-22 PROCEDURE — 97530 THERAPEUTIC ACTIVITIES: CPT

## 2023-07-22 PROCEDURE — 6370000000 HC RX 637 (ALT 250 FOR IP)

## 2023-07-22 PROCEDURE — 6360000002 HC RX W HCPCS

## 2023-07-22 PROCEDURE — 36415 COLL VENOUS BLD VENIPUNCTURE: CPT

## 2023-07-22 PROCEDURE — 85652 RBC SED RATE AUTOMATED: CPT

## 2023-07-22 PROCEDURE — 85027 COMPLETE CBC AUTOMATED: CPT

## 2023-07-22 PROCEDURE — 80053 COMPREHEN METABOLIC PANEL: CPT

## 2023-07-22 PROCEDURE — 86140 C-REACTIVE PROTEIN: CPT

## 2023-07-22 PROCEDURE — 97535 SELF CARE MNGMENT TRAINING: CPT

## 2023-07-22 PROCEDURE — 99232 SBSQ HOSP IP/OBS MODERATE 35: CPT | Performed by: INTERNAL MEDICINE

## 2023-07-22 PROCEDURE — 97110 THERAPEUTIC EXERCISES: CPT

## 2023-07-22 PROCEDURE — 1200000000 HC SEMI PRIVATE

## 2023-07-22 RX ADMIN — POLYETHYLENE GLYCOL 3350 17 G: 17 POWDER, FOR SOLUTION ORAL at 10:02

## 2023-07-22 RX ADMIN — SODIUM CHLORIDE: 9 INJECTION, SOLUTION INTRAVENOUS at 02:43

## 2023-07-22 RX ADMIN — SODIUM CHLORIDE: 9 INJECTION, SOLUTION INTRAVENOUS at 17:13

## 2023-07-22 RX ADMIN — CLINDAMYCIN IN 5 PERCENT DEXTROSE 600 MG: 12 INJECTION, SOLUTION INTRAVENOUS at 17:14

## 2023-07-22 RX ADMIN — CLINDAMYCIN IN 5 PERCENT DEXTROSE 600 MG: 12 INJECTION, SOLUTION INTRAVENOUS at 19:59

## 2023-07-22 RX ADMIN — ENOXAPARIN SODIUM 40 MG: 100 INJECTION SUBCUTANEOUS at 19:56

## 2023-07-22 RX ADMIN — CLINDAMYCIN IN 5 PERCENT DEXTROSE 600 MG: 12 INJECTION, SOLUTION INTRAVENOUS at 08:53

## 2023-07-22 RX ADMIN — SENNOSIDES AND DOCUSATE SODIUM 2 TABLET: 50; 8.6 TABLET ORAL at 10:02

## 2023-07-22 RX ADMIN — HYDROCODONE BITARTRATE AND ACETAMINOPHEN 2 TABLET: 5; 325 TABLET ORAL at 02:47

## 2023-07-22 RX ADMIN — HYDROCODONE BITARTRATE AND ACETAMINOPHEN 2 TABLET: 5; 325 TABLET ORAL at 19:56

## 2023-07-22 RX ADMIN — CLINDAMYCIN IN 5 PERCENT DEXTROSE 600 MG: 12 INJECTION, SOLUTION INTRAVENOUS at 02:44

## 2023-07-22 RX ADMIN — CEFEPIME 2000 MG: 2 INJECTION, POWDER, FOR SOLUTION INTRAVENOUS at 07:49

## 2023-07-22 RX ADMIN — HYDROCODONE BITARTRATE AND ACETAMINOPHEN 2 TABLET: 5; 325 TABLET ORAL at 12:22

## 2023-07-22 ASSESSMENT — PAIN DESCRIPTION - ORIENTATION
ORIENTATION: RIGHT

## 2023-07-22 ASSESSMENT — PAIN DESCRIPTION - DESCRIPTORS
DESCRIPTORS: ACHING
DESCRIPTORS: ACHING;SHARP
DESCRIPTORS: THROBBING

## 2023-07-22 ASSESSMENT — PAIN DESCRIPTION - PAIN TYPE
TYPE: SURGICAL PAIN
TYPE: SURGICAL PAIN

## 2023-07-22 ASSESSMENT — PAIN DESCRIPTION - FREQUENCY: FREQUENCY: CONTINUOUS

## 2023-07-22 ASSESSMENT — PAIN DESCRIPTION - LOCATION
LOCATION: ANKLE;FOOT
LOCATION: FOOT
LOCATION: FOOT

## 2023-07-22 ASSESSMENT — PAIN SCALES - GENERAL
PAINLEVEL_OUTOF10: 4
PAINLEVEL_OUTOF10: 5
PAINLEVEL_OUTOF10: 7
PAINLEVEL_OUTOF10: 8
PAINLEVEL_OUTOF10: 8

## 2023-07-22 ASSESSMENT — PAIN DESCRIPTION - ONSET: ONSET: ON-GOING

## 2023-07-22 ASSESSMENT — PAIN - FUNCTIONAL ASSESSMENT
PAIN_FUNCTIONAL_ASSESSMENT: PREVENTS OR INTERFERES SOME ACTIVE ACTIVITIES AND ADLS
PAIN_FUNCTIONAL_ASSESSMENT: PREVENTS OR INTERFERES SOME ACTIVE ACTIVITIES AND ADLS

## 2023-07-22 NOTE — PLAN OF CARE
Problem: Skin/Tissue Integrity  Goal: Absence of new skin breakdown  Description: 1. Monitor for areas of redness and/or skin breakdown  2. Assess vascular access sites hourly  3. Incontinency care with each episode  4. Educated on the importance of turning and repositioning   5. Dietary intake monitored and supplements encouraged  7/22/2023 1618 by Sigrid Rodriguez RN  Outcome: Not Progressing     Problem: Discharge Planning  Goal: Discharge to home or other facility with appropriate resources  7/22/2023 1618 by Sigrid Rodriguez RN  Outcome: Progressing     Problem: Pain  Goal: Verbalizes/displays adequate comfort level or baseline comfort level  7/22/2023 1618 by Sigrid Rodriguez RN  Outcome: Progressing   Pt states pain is currently tolerable, verbalizes understanding of pain scale and interventions. Will continue to assess pain hourly upon rounding and prn, monitor effectiveness of interventions, educate patient on side effects of medications and report finding to physician when necessary.       Problem: Genitourinary - Adult  Goal: Absence of urinary retention  7/22/2023 1618 by Sigrid Rodriguez RN  Outcome: Progressing   Pt incontinent at this time, voiding without difficulty, voices no complaints of pain, burning or frequency

## 2023-07-22 NOTE — PROGRESS NOTES
Physical Therapy  Facility/Department: [facility] MED SURG  Daily Treatment Note  NAME: Jose Carlos Blankenship  : 1940  MRN: [id number]    Date of Service: 2023    Discharge Recommendations:  Patient would benefit from continued therapy after discharge      Patient Diagnosis(es): The primary encounter diagnosis was Right foot infection. A diagnosis of Acute osteomyelitis of right calcaneus [facility]) was also pertinent to this visit. Assessment   Assessment: Pt with global deconditioning limiting all mobility. Pt required max A x2 for bed mobility and transfer to sitting positon at EOB. AM-PAC score of 824 for current level of function. Activity Tolerance: Patient limited by endurance; Patient limited by fatigue;Patient limited by pain     Plan    Physcial Therapy Plan  General Plan: 5-7 times per week  Current Treatment Recommendations: Strengthening;Balance training; Endurance training;Neuromuscular re-education;Patient/Caregiver education & training; Safety education & training;Home exercise program;Therapeutic activities; Positioning     Restrictions  Restrictions/Precautions  Restrictions/Precautions: Weight Bearing, Bed Alarm, Up as Tolerated, General Precautions  Required Braces or Orthoses?: No  Lower Extremity Weight Bearing Restrictions  Right Lower Extremity Weight Bearing: Non Weight Bearing  Position Activity Restriction  Other position/activity restrictions: IV, purwick, bilat. heel offloading PRAFO boots, NWB R LE     Subjective    Subjective  Subjective: Pt agreeable to PT session (Nurse gives approval for PT session)  Orientation  Overall Orientation Status: Within Functional Limits   Objective   Bed Mobility Training  Bed Mobility Training: Yes  Overall Level of Assistance: Maximum assistance;Assist X2  Interventions: Safety awareness training; Tactile cues; Verbal cues  Rolling: Maximum assistance;Assist X2  ( multiple times rolling for brief change and positioning)  Supine to Sit: Maximum assistance;Assist X2  Sit to Supine: Maximum assistance; Total assistance;Assist X2  Scooting: Maximum assistance; Total assistance;Assist X2  Balance  Sitting: High guard  Transfer Training  Transfer Training: No  Gait Training  Gait Training: No  Neuromuscular Education  NDT Treatment: Sitting;Standing  PT Exercises  A/AROM Exercises: LAQ  Pressure Relief Exercises: weight shifting sitting at EOB with mod A  Static Sitting Balance Exercises: sitting at EOB with B foot placement on floor x 10min  Postural Correction Exercises: upright posture  Breathing Techniques: deep breathing with instruction on correct use of incentive spirometer         Goals  Short Term Goals  Time Frame for Short Term Goals: 12 visits  Short Term Goal 1: Patient will tolerate 30 minutes of ther-ex and ther-act. Short Term Goal 2: Patient will performed bed mobility with mod assist x 2. Short Term Goal 3: Pt. to be indep with pressure relief in bed by rolling L/R, moving UEs / LEs  Short Term Goal 4: Patient will tolerate sitting EOB with supervision. Patient Goals   Patient Goals : Improve back pain    Education  Patient Education  Education Given To: Patient  Education Provided: Role of Therapy;Plan of Care  Education Provided Comments: imp of rolling in bed for pressure relief  Education Method: Verbal;Teach Back  Education Outcome: Verbalized understanding;Continued education needed    Therapy Time   Individual Concurrent Group Co-treatment   Time In       1130   Time Out       1208   Minutes       45      Co-treatment with OT warranted secondary to decreased safety and independence requiring 2 skilled therapy professionals to address individual discipline's goals. PT addressing   , pre gait trunk strengthening, weight shifting prior to transfers, transfer training, and postural control in sitting.    Hope Reason, PTA

## 2023-07-22 NOTE — ANESTHESIA POSTPROCEDURE EVALUATION
Department of Anesthesiology  Postprocedure Note    Patient: Omar West  MRN: [id number]  YOB: 1940  Date of evaluation: 7/21/2023      Procedure Summary     Date: 07/21/23 Room / Location: [address] [place] [address] - INPATIENT    Anesthesia Start: 1761 Anesthesia Stop: 9975    Procedure: RIGHT PARTIAL CALCANECTOMY, SOFT TISSUE TRANSFER, [address] (Right: Foot) Diagnosis:       Acute osteomyelitis of right calcaneus ([address])      (Acute osteomyelitis of right calcaneus ([address]) [V60.707])    Surgeons: Saima Briggs DPM Responsible Provider: Chacho Spangler MD    Anesthesia Type: general ASA Status: 3          Anesthesia Type: No value filed.     Glenis Phase I: Glenis Score: 8    Glenis Phase II:        Anesthesia Post Evaluation    Patient location during evaluation: PACU  Patient participation: complete - patient participated  Level of consciousness: awake  Airway patency: patent  Nausea & Vomiting: no nausea  Complications: no  Cardiovascular status: blood pressure returned to baseline  Respiratory status: acceptable  Hydration status: euvolemic  Comments: Multimodal analgesia pain management as indicated by procedure  Multimodal analgesia pain management approach

## 2023-07-22 NOTE — PROGRESS NOTES
Progress Note  Podiatric Medicine and Surgery     Subjective     CC: right heel wound; status post partial calcanectomy with closure (DOS 7/21/2023)    -Patient was seen and examined at bedside during rounding this evening  -Patient is status post partial calcanectomy with closure  -Patient states that she does feel pain to her right lower extremity however it is well controlled pain medication  -Patient has questions regarding her postoperative care. All questions answered to her satisfaction  -Patient denies any systemic signs of infection including nausea, vomiting, fever, chills, diarrhea, shortness of breath or chest pain at this time      HPI :  Aline Guerra is a 79 y.o. female seen at [facility] ED for nonhealing right heel wound and possible concern for infection. Patient was evaluated by podiatrist from [place], who advised her to present to ER for concern for worsening wound infection. Patient states she has had this heel wound for quite some time, not currently on antibiotics. Patient reports wound is currently tender to touch, denies any changes in activity or trauma. Patient currently resides at an outside facility, reports she is not very active. Denies any other pedal complaints at this time. PCP is Ras Chavez MD    ROS: Review of Systems   Constitutional:  Negative for chills and fever. Respiratory:  Negative for chest tightness and shortness of breath. Cardiovascular:  Negative for chest pain and leg swelling. Gastrointestinal:  Negative for abdominal pain, constipation, diarrhea, nausea and vomiting. Musculoskeletal:  Positive for gait problem and myalgias. Negative for arthralgias and joint swelling. Skin:  Positive for color change and wound. Neurological:  Negative for weakness and numbness.      Medications:  Scheduled Meds:   sodium chloride flush  5-40 mL IntraVENous 2 times per day    enoxaparin  40 mg SubCUTAneous Daily    [Held by provider] apixaban  5 mg ABDs, Kerlix, Ace and well-padded posterior splint  No further surgical intervention planned from podiatry standpoint at this time. Patient is stable for discharge from podiatry standpoint once cleared from all other medical specialties and final recommendations given. Patient is to remain nonweightbearing to her right lower extremity. She is to keep her dressings dry clean and intact until follow-up visit with her wound care center. She will be transported back to her facility and will make and attend a follow-up visit with her wound care center in [facility]. Podiatry to sign off at this time.   Please [facility] on-call resident with any questions or concerns that may arise  NWB to Right lower extremity  Discussed with Dr. Kayla Carrillo, [facility]   Podiatric Medicine & Surgery   7/22/2023 at 7:43 AM

## 2023-07-22 NOTE — PROGRESS NOTES
[facility]  Office: [phone]  Marley Santizo, DO, Mariana Frederick, DO, Veda Alonso, DO, Omar Nelson, DO, Jairo Olmos MD, Mariel Moffett MD, Cecilia Woodson MD, Delores Blank MD,  Elaine Barragan MD, Nathaly Clarke MD, Sheree Durand DO, Pradip Aponte MD,  Flavia Herron MD, Suzanne Delarosa MD, Duke Lopes DO, Suellen Culver MD,  No Hernández DO, Manas Eastman MD, Marlene Vidal MD, Misty Levi MD, Clarissa Meneses MD,  Kyle Vides MD, Joesph Elizondo MD, Samra Acevedo DO, Jose Rodney MD,  Ayaan Guajardo MD, Armida Knowles, Svetlana Elkins, CNP, Jordin Waters, CNP, Laurence Arreola, CNP,  Pino Burton, [facility], Katt Costello, CNP, Cristela Ma, CNP, Agustina Josue, CNP, Claudell Bowler, CNP, Yohannes Merchant, CNP, Edgar Low, PACariC, Anastacio Huang, CNS, Jason Manning, CNP, Rafa Huff, [facility]    Progress Note    7/22/2023    7:38 AM    Name:   Dee Dee Adler  MRN:     [id number]     Acct:      [de-identified]   Room:   2010/2010-02  IP Day:  4  Admit Date:  7/18/2023 12:16 PM    PCP:   Elkin Venegas MD  Code Status:  Full Code    Subjective:     C/C:   Chief Complaint   Patient presents with    Wound Infection     Patient arrived via EMS from [address] [place] [address] to be evaluated by podiatry. Patient has a right foot infection with a hx of osteomyelitis. Interval History Status: improved. Patient is resting comfortably denies any complaints of chest pain, shortness of breath, nausea vomiting, fevers or chills. Postop day 1 I&D of foot abscess and partial calcanectomy due to osteomyelitis. Brief History: This 79-year-old female presents with a complaint of right foot wound is admitted for IV antibiotics and podiatry evaluation. Concern for abscess with osteomyelitis and scheduled for surgery on the 21st for I&D and debridement of the bone.     Review of results for input(s): POCGLU in the last 72 hours. I/O (24Hr): Intake/Output Summary (Last 24 hours) at 7/22/2023 0738  Last data filed at 7/22/2023 1358  Gross per 24 hour   Intake 1935.15 ml   Output 700 ml   Net 1235.15 ml       Labs:  Hematology:  Recent Labs     07/20/23  0739 07/21/23  0606 07/22/23  0523   WBC 7.0 8.3 10.5   RBC 3.54* 3.80* 3.88*   HGB 10.2* 11.0* 11.2*   HCT 32.8* 35.0* 35.0*   MCV 92.7 92.1 90.2   MCH 28.8 28.9 28.9   MCHC 31.1 31.4 32.0   RDW 15.7* 15.8* 15.3*    245 247   MPV 10.0 10.4 10.7   SEDRATE 41*  --  62*   CRP 11.0*  --  6.6*     Chemistry:  Recent Labs     07/20/23  0739 07/21/23  0606 07/22/23  0523    141 137   K 3.6* 3.6* 4.0   * 108* 104   CO2 25 24 24   GLUCOSE 89 85 112*   BUN 11 10 10   CREATININE 0.7 0.6 0.5   ANIONGAP 8* 9 9   LABGLOM >60 >60 >60   CALCIUM 8.2* 8.3* 8.4*     Recent Labs     07/20/23  0739 07/21/23 0606 07/22/23  0523   PROT 5.4* 5.9* 5.8*   LABALBU 2.6* 2.8* 2.9*   AST 13 19 21   ALT 8 12 14   ALKPHOS 58 61 66   BILITOT 0.3 0.3 0.3     ABG:No results found for: POCPH, PHART, PH, POCPCO2, SRI0XSE, PCO2, POCPO2, PO2ART, PO2, POCHCO3, OCS0XDQ, HCO3, NBEA, PBEA, BEART, BE, THGBART, THB, OIW8XHV, TSYE5RRD, O5ZFIPBB, O2SAT, FIO2  Lab Results   Component Value Date/Time    SPECIAL 17ML LW 07/18/2023 01:24 PM     Lab Results   Component Value Date/Time    CULTURE (A) 07/18/2023 02:40 PM     METHICILLIN RESISTANT STAPHYLOCOCCUS AUREUS LIGHT GROWTH    CULTURE NORMAL SKIN CAITLYN 07/18/2023 02:40 PM    CULTURE No anaerobic organisms isolated at 3 days. 07/18/2023 02:40 PM       Radiology:  XR FOOT RIGHT (MIN 3 VIEWS)    Result Date: 7/18/2023  Diffuse soft tissue swelling and arthritic changes without acute osseous abnormality. Mineralization complicates assessment. RECOMMENDATION: Advise osteomyelitis is of concern would advise three-phase radionuclide imaging or MRI imaging. MRI ANKLE RIGHT WO CONTRAST    Result Date: 7/18/2023  1.

## 2023-07-22 NOTE — CARE COORDINATION
BEHZAD Bailon, LSW    Case Management  Care Coordination      Signed  Date of Service:  7/21/2023  3:34 PM     Signed                  Social work: [facility] was prior rto admission. [facility]: [phone]  Fax weekends [phone]  FAx weekdays [phone]     [facility] in admissions will take pt back  however if they are short staff on weekend they may not be able to take her until Monday morning. Will fax updates. Pr may need long term care and had only 8 medicare days left when she arrived at [facility]. Will need megha, Rx and discharge order for snf. Lennox Meeks ls            Address is [address] [facility] per prior notes. Will fax updates today. Will need megha, Rx and discharge order for snf at discharge. No new hens needed they will accept pt back. Probably not on weekend due to staffing.  Lennox morleyw

## 2023-07-22 NOTE — PROGRESS NOTES
Occupational Therapy  Facility/Department: [facility] MED SURG  Rehabilitation Occupational Therapy Daily Treatment Note    Date: 23  Patient Name: Yen Vela       Room:   MRN: [id number]  Account: [de-identified]   : 1940  (79 y.o.) Gender: female        Pt currently functioning below baseline. Recommend daily inpatient skilled therapy at time of discharge to maximize long term outcomes and prevent re-admission. Please refer to AM-PAC score for current level of function. Past Medical History:  has a past medical history of DVT (deep venous thrombosis) ([address]), Hypertension, Lumbar stenosis, ALANA (obstructive sleep apnea), and Venous insufficiency. Past Surgical History:   has no past surgical history on file. Restrictions  Restrictions/Precautions: Weight Bearing, Bed Alarm, Up as Tolerated, General Precautions  Other position/activity restrictions: IV, purwick, bilat. heel offloading PRAFO boots, NWB R LE  Right Lower Extremity Weight Bearing: Non Weight Bearing  Required Braces or Orthoses?: No    Subjective  Subjective: Pt in bed upon arrival. RN stated pt was appropriate for therapy. Pt had surgery day prior, NWB R LE. Pt agreeable to participate in therapy. Restrictions/Precautions: Weight Bearing;Bed Alarm; Up as Tolerated;General Precautions             Objective     Cognition  Overall Cognitive Status: Exceptions  Arousal/Alertness: Appropriate responses to stimuli  Following Commands: Follows one step commands with increased time; Follows one step commands with repetition  Attention Span: Attends with cues to redirect  Memory: Decreased short term memory;Decreased recall of recent events  Safety Judgement: Decreased awareness of need for assistance;Decreased awareness of need for safety  Problem Solving: Assistance required to generate solutions;Assistance required to implement solutions;Assistance required to correct errors made;Assistance required to identify errors made;Decreased awareness of errors  Insights: Decreased awareness of deficits  Initiation: Requires cues for all  Sequencing: Requires cues for all  Orientation  Overall Orientation Status: Within Functional Limits   Perception  Overall Perceptual Status: Impaired     ADL  Putting On/Taking Off Footwear  Assistance Level: Dependent  Skilled Clinical Factors: to terra left sock  Toileting  Assistance Level: Dependent; Requires x 2 assistance  Skilled Clinical Factors: in supine to change brief and for hygiene          Functional Mobility  Skilled Clinical Factors: UNABLE, will req levar/debbie lift to get out of bed. Bed Mobility  Overall Assistance Level: Maximum Assistance; Requires x 2 Assistance  Additional Factors: Set-up; Verbal cues; Increased time to complete; Head of bed flat;Head of bed raised; With handrails; Without handrails  Roll Left  Assistance Level: Maximum assistance; Requires x 2 assistance  Roll Right  Assistance Level: Maximum assistance; Requires x 2 assistance  Sit to Supine  Assistance Level: Maximum assistance; Requires x 2 assistance  Supine to Sit  Assistance Level: Maximum assistance; Requires x 2 assistance  Scooting  Assistance Level: Maximum assistance; Requires x 2 assistance  Sit to Stand  Skilled Clinical Factors: Attempted STS from EOB using kira stedy but pt is unable to get any clearance off bed even with MAX A x2 from writer and PTA. Neuromuscular Education  Neuromuscular education: Yes  NDT Treatment: Sitting;Standing     Assessment  Assessment  Assessment: Pt tolerated session poorly this date due to pain. Pt is limited by decreased activity tolerance, global weakness and inability to care for self. Pt req'd MAX A x2 for all bed mobility and is unable to to stand, transfer or ambulate. Pt is NWB R LE and will require a levar lift to get up and out of bed. Pt is MAX/DEP with all self care.  Skilled OT indicated to increase safety and IND with all functional tasks to ensure a safe return to

## 2023-07-22 NOTE — PLAN OF CARE
Problem: Discharge Planning  Goal: Discharge to home or other facility with appropriate resources  Outcome: Progressing     Problem: Pain  Goal: Verbalizes/displays adequate comfort level or baseline comfort level  Outcome: Progressing     Problem: Safety - Adult  Goal: Free from fall injury  Outcome: Progressing     Problem: Skin/Tissue Integrity  Goal: Absence of new skin breakdown  Description: 1. Monitor for areas of redness and/or skin breakdown  2. Assess vascular access sites hourly  3. Every 4-6 hours minimum:  Change oxygen saturation probe site  4. Every 4-6 hours:  If on nasal continuous positive airway pressure, respiratory therapy assess nares and determine need for appliance change or resting period.   Outcome: Progressing     Problem: Skin/Tissue Integrity - Adult  Goal: Skin integrity remains intact  Outcome: Progressing  Goal: Incisions, wounds, or drain sites healing without S/S of infection  Outcome: Progressing  Goal: Oral mucous membranes remain intact  Outcome: Progressing     Problem: Genitourinary - Adult  Goal: Absence of urinary retention  Outcome: Progressing  Goal: Urinary catheter remains patent  Outcome: Progressing     Problem: Musculoskeletal - Adult  Goal: Return mobility to safest level of function  Outcome: Progressing  Goal: Maintain proper alignment of affected body part  Outcome: Progressing  Goal: Return ADL status to a safe level of function  Outcome: Progressing     Problem: Neurosensory - Adult  Goal: Achieves stable or improved neurological status  Outcome: Progressing  Goal: Achieves maximal functionality and self care  Outcome: Progressing     Problem: Respiratory - Adult  Goal: Achieves optimal ventilation and oxygenation  Outcome: Progressing

## 2023-07-23 LAB
ALBUMIN SERPL-MCNC: 2.8 G/DL (ref 3.5–5.2)
ALP SERPL-CCNC: 58 U/L (ref 35–104)
ALT SERPL-CCNC: 13 U/L (ref 5–33)
ANION GAP SERPL CALCULATED.3IONS-SCNC: 7 MMOL/L (ref 9–17)
AST SERPL-CCNC: 15 U/L
BASOPHILS # BLD: 0.05 K/UL (ref 0–0.2)
BASOPHILS NFR BLD: 1 % (ref 0–2)
BILIRUB SERPL-MCNC: 0.3 MG/DL (ref 0.3–1.2)
BUN SERPL-MCNC: 16 MG/DL (ref 8–23)
BUN/CREAT SERPL: 23 (ref 9–20)
CALCIUM SERPL-MCNC: 8.1 MG/DL (ref 8.6–10.4)
CHLORIDE SERPL-SCNC: 107 MMOL/L (ref 98–107)
CO2 SERPL-SCNC: 26 MMOL/L (ref 20–31)
CREAT SERPL-MCNC: 0.7 MG/DL (ref 0.5–0.9)
EOSINOPHIL # BLD: 0.28 K/UL (ref 0–0.44)
EOSINOPHILS RELATIVE PERCENT: 3 % (ref 1–4)
ERYTHROCYTE [DISTWIDTH] IN BLOOD BY AUTOMATED COUNT: 16 % (ref 11.8–14.4)
GFR SERPL CREATININE-BSD FRML MDRD: >60 ML/MIN/1.73M2
GLUCOSE SERPL-MCNC: 81 MG/DL (ref 70–99)
HCT VFR BLD AUTO: 31.8 % (ref 36.3–47.1)
HGB BLD-MCNC: 10.2 G/DL (ref 11.9–15.1)
IMM GRANULOCYTES # BLD AUTO: 0.04 K/UL (ref 0–0.3)
IMM GRANULOCYTES NFR BLD: 0 %
LYMPHOCYTES NFR BLD: 2.75 K/UL (ref 1.1–3.7)
LYMPHOCYTES RELATIVE PERCENT: 29 % (ref 24–43)
MCH RBC QN AUTO: 29.3 PG (ref 25.2–33.5)
MCHC RBC AUTO-ENTMCNC: 32.1 G/DL (ref 28.4–34.8)
MCV RBC AUTO: 91.4 FL (ref 82.6–102.9)
MICROORGANISM SPEC CULT: NORMAL
MICROORGANISM SPEC CULT: NORMAL
MONOCYTES NFR BLD: 0.8 K/UL (ref 0.1–1.2)
MONOCYTES NFR BLD: 8 % (ref 3–12)
NEUTROPHILS NFR BLD: 59 % (ref 36–65)
NEUTS SEG NFR BLD: 5.55 K/UL (ref 1.5–8.1)
NRBC BLD-RTO: 0 PER 100 WBC
PLATELET # BLD AUTO: 213 K/UL (ref 138–453)
PMV BLD AUTO: 10.8 FL (ref 8.1–13.5)
POTASSIUM SERPL-SCNC: 3.4 MMOL/L (ref 3.7–5.3)
PROT SERPL-MCNC: 5.6 G/DL (ref 6.4–8.3)
RBC # BLD AUTO: 3.48 M/UL (ref 3.95–5.11)
RBC # BLD: ABNORMAL 10*6/UL
SERVICE CMNT-IMP: NORMAL
SERVICE CMNT-IMP: NORMAL
SODIUM SERPL-SCNC: 140 MMOL/L (ref 135–144)
SPECIMEN DESCRIPTION: NORMAL
SPECIMEN DESCRIPTION: NORMAL
WBC OTHER # BLD: 9.5 K/UL (ref 3.5–11.3)

## 2023-07-23 PROCEDURE — 99232 SBSQ HOSP IP/OBS MODERATE 35: CPT | Performed by: INTERNAL MEDICINE

## 2023-07-23 PROCEDURE — 2580000003 HC RX 258

## 2023-07-23 PROCEDURE — 85027 COMPLETE CBC AUTOMATED: CPT

## 2023-07-23 PROCEDURE — 36415 COLL VENOUS BLD VENIPUNCTURE: CPT

## 2023-07-23 PROCEDURE — 6370000000 HC RX 637 (ALT 250 FOR IP)

## 2023-07-23 PROCEDURE — 2580000003 HC RX 258: Performed by: INTERNAL MEDICINE

## 2023-07-23 PROCEDURE — 6360000002 HC RX W HCPCS: Performed by: INTERNAL MEDICINE

## 2023-07-23 PROCEDURE — 6360000002 HC RX W HCPCS

## 2023-07-23 PROCEDURE — 80053 COMPREHEN METABOLIC PANEL: CPT

## 2023-07-23 PROCEDURE — 1200000000 HC SEMI PRIVATE

## 2023-07-23 PROCEDURE — 2500000003 HC RX 250 WO HCPCS

## 2023-07-23 RX ADMIN — VANCOMYCIN HYDROCHLORIDE 2500 MG: 500 INJECTION, POWDER, LYOPHILIZED, FOR SOLUTION INTRAVENOUS at 13:47

## 2023-07-23 RX ADMIN — SENNOSIDES AND DOCUSATE SODIUM 2 TABLET: 50; 8.6 TABLET ORAL at 09:50

## 2023-07-23 RX ADMIN — CLINDAMYCIN IN 5 PERCENT DEXTROSE 600 MG: 12 INJECTION, SOLUTION INTRAVENOUS at 09:42

## 2023-07-23 RX ADMIN — METOPROLOL TARTRATE 5 MG: 5 INJECTION INTRAVENOUS at 22:03

## 2023-07-23 RX ADMIN — APIXABAN 5 MG: 5 TABLET, FILM COATED ORAL at 20:06

## 2023-07-23 RX ADMIN — HYDROCODONE BITARTRATE AND ACETAMINOPHEN 2 TABLET: 5; 325 TABLET ORAL at 19:23

## 2023-07-23 RX ADMIN — CLINDAMYCIN IN 5 PERCENT DEXTROSE 600 MG: 12 INJECTION, SOLUTION INTRAVENOUS at 02:32

## 2023-07-23 RX ADMIN — SODIUM CHLORIDE, PRESERVATIVE FREE 10 ML: 5 INJECTION INTRAVENOUS at 20:21

## 2023-07-23 RX ADMIN — POTASSIUM CHLORIDE 40 MEQ: 1500 TABLET, EXTENDED RELEASE ORAL at 09:44

## 2023-07-23 RX ADMIN — HYDROCODONE BITARTRATE AND ACETAMINOPHEN 2 TABLET: 5; 325 TABLET ORAL at 12:32

## 2023-07-23 RX ADMIN — HYDROCODONE BITARTRATE AND ACETAMINOPHEN 2 TABLET: 5; 325 TABLET ORAL at 05:01

## 2023-07-23 ASSESSMENT — PAIN SCALES - GENERAL
PAINLEVEL_OUTOF10: 5
PAINLEVEL_OUTOF10: 7
PAINLEVEL_OUTOF10: 7
PAINLEVEL_OUTOF10: 8

## 2023-07-23 ASSESSMENT — PAIN DESCRIPTION - DESCRIPTORS
DESCRIPTORS: ACHING;SHARP
DESCRIPTORS: ACHING
DESCRIPTORS: ACHING

## 2023-07-23 ASSESSMENT — PAIN DESCRIPTION - ONSET
ONSET: ON-GOING

## 2023-07-23 ASSESSMENT — PAIN DESCRIPTION - PAIN TYPE
TYPE: SURGICAL PAIN
TYPE: ACUTE PAIN;SURGICAL PAIN
TYPE: ACUTE PAIN;SURGICAL PAIN

## 2023-07-23 ASSESSMENT — PAIN DESCRIPTION - LOCATION
LOCATION: LEG
LOCATION: FOOT
LOCATION: FOOT

## 2023-07-23 ASSESSMENT — PAIN - FUNCTIONAL ASSESSMENT
PAIN_FUNCTIONAL_ASSESSMENT: PREVENTS OR INTERFERES SOME ACTIVE ACTIVITIES AND ADLS

## 2023-07-23 ASSESSMENT — PAIN DESCRIPTION - ORIENTATION
ORIENTATION: RIGHT;LEFT
ORIENTATION: RIGHT
ORIENTATION: RIGHT;LEFT

## 2023-07-23 ASSESSMENT — PAIN DESCRIPTION - FREQUENCY
FREQUENCY: CONTINUOUS

## 2023-07-23 NOTE — PLAN OF CARE
Problem: Discharge Planning  Goal: Discharge to home or other facility with appropriate resources  7/23/2023 1221 by Talia Jackson RN  Outcome: Progressing  Flowsheets (Taken 7/23/2023 0800)  Discharge to home or other facility with appropriate resources:   Identify barriers to discharge with patient and caregiver   Arrange for needed discharge resources and transportation as appropriate   Identify discharge learning needs (meds, wound care, etc)   Refer to discharge planning if patient needs post-hospital services based on physician order or complex needs related to functional status, cognitive ability or social support system  7/23/2023 0116 by Driss Barlow RN  Outcome: Progressing     Problem: Pain  Goal: Verbalizes/displays adequate comfort level or baseline comfort level  7/23/2023 1221 by Talia Jackson RN  Outcome: Progressing  7/23/2023 0116 by Driss Barlow RN  Outcome: Progressing     Problem: Safety - Adult  Goal: Free from fall injury  7/23/2023 1221 by Talia Jackson RN  Outcome: Progressing  7/23/2023 0116 by Driss Barlow RN  Outcome: Progressing     Problem: Skin/Tissue Integrity  Goal: Absence of new skin breakdown  Description: 1. Monitor for areas of redness and/or skin breakdown  2. Assess vascular access sites hourly  3. Every 4-6 hours minimum:  Change oxygen saturation probe site  4. Every 4-6 hours:  If on nasal continuous positive airway pressure, respiratory therapy assess nares and determine need for appliance change or resting period.   7/23/2023 1221 by Talia Jackson RN  Outcome: Progressing  7/23/2023 0116 by Driss Barlow RN  Outcome: Progressing     Problem: Skin/Tissue Integrity - Adult  Goal: Skin integrity remains intact  7/23/2023 1221 by Talia Jackson RN  Outcome: Progressing  Flowsheets (Taken 7/23/2023 0800)  Skin Integrity Remains Intact: Monitor for areas of redness and/or skin breakdown  7/23/2023 0116 by Driss Barlow RN  Outcome: Progressing  Flowsheets (Taken 7/22/2023

## 2023-07-23 NOTE — PLAN OF CARE
Problem: Discharge Planning  Goal: Discharge to home or other facility with appropriate resources  7/23/2023 0116 by Driss Barlow RN  Outcome: Progressing  7/22/2023 1618 by Lily Cosme RN  Outcome: Progressing     Problem: Pain  Goal: Verbalizes/displays adequate comfort level or baseline comfort level  7/23/2023 0116 by Driss Barlow RN  Outcome: Progressing  7/22/2023 1618 by Lily Cosme RN  Outcome: Progressing     Problem: Safety - Adult  Goal: Free from fall injury  7/23/2023 0116 by Driss Barlow RN  Outcome: Progressing  7/22/2023 1618 by Lily Cosme RN  Outcome: Adequate for Discharge     Problem: Skin/Tissue Integrity  Goal: Absence of new skin breakdown  Description: 1. Monitor for areas of redness and/or skin breakdown  2. Assess vascular access sites hourly  3. Every 4-6 hours minimum:  Change oxygen saturation probe site  4. Every 4-6 hours:  If on nasal continuous positive airway pressure, respiratory therapy assess nares and determine need for appliance change or resting period.   7/23/2023 0116 by Driss Barlow RN  Outcome: Progressing  7/22/2023 1618 by Lily Cosme RN  Outcome: Not Progressing     Problem: Skin/Tissue Integrity - Adult  Goal: Skin integrity remains intact  Outcome: Progressing  Flowsheets (Taken 7/22/2023 1955)  Skin Integrity Remains Intact: Monitor for areas of redness and/or skin breakdown  Goal: Incisions, wounds, or drain sites healing without S/S of infection  Outcome: Progressing  Flowsheets (Taken 7/22/2023 1955)  Incisions, Wounds, or Drain Sites Healing Without Sign and Symptoms of Infection:   TWICE DAILY: Assess and document skin integrity   TWICE DAILY: Assess and document dressing/incision, wound bed, drain sites and surrounding tissue   Implement wound care per orders   Initiate pressure ulcer prevention bundle as indicated  Goal: Oral mucous membranes remain intact  Outcome: Progressing     Problem: Genitourinary - Adult  Goal: Absence of urinary retention  7/23/2023 0116 by Pratibha Mensah RN  Outcome: Progressing  7/22/2023 1618 by Guanaco Glover RN  Outcome: Progressing  Goal: Urinary catheter remains patent  Outcome: Progressing     Problem: Musculoskeletal - Adult  Goal: Return mobility to safest level of function  Outcome: Progressing  Goal: Maintain proper alignment of affected body part  Outcome: Progressing  Goal: Return ADL status to a safe level of function  Outcome: Progressing     Problem: Neurosensory - Adult  Goal: Achieves stable or improved neurological status  Outcome: Progressing  Goal: Achieves maximal functionality and self care  Outcome: Progressing     Problem: Respiratory - Adult  Goal: Achieves optimal ventilation and oxygenation  Outcome: Progressing     Problem: Skin/Tissue Integrity  Goal: Absence of new skin breakdown  Description: 1. Monitor for areas of redness and/or skin breakdown  2. Assess vascular access sites hourly  3. Every 4-6 hours minimum:  Change oxygen saturation probe site  4. Every 4-6 hours:  If on nasal continuous positive airway pressure, respiratory therapy assess nares and determine need for appliance change or resting period.   7/23/2023 0116 by Pratibha Mensah RN  Outcome: Progressing  7/22/2023 1618 by Guanaco Glover RN  Outcome: Not Progressing

## 2023-07-23 NOTE — PROGRESS NOTES
[facility]  Office: [phone]  Maye Montaño, DO, Brian Byrne, DO, Chantel Amaro, DO, Chano Nelson, DO, Frank Marroquin MD, Maria Luz Arita MD, Jalen Valdovinos MD, Lou Bradley MD,  Tahir Mckeon MD, Doug Burroughs MD, Cindi August DO, Estiven Rivera MD,  Pradeep Rolle MD, Fermin Knight MD, Zohra Trotter DO, Jesusita Modi MD,  Jorge Mac DO, Ross Bernard MD, Lise Plunkett MD, Trae Kate MD, Josefa Colbert MD,  Juan Akbar MD, Marlon Dawn MD, Samantha Barger DO, Edna Razo MD,  Claudio Durbin MD, Marek Baum, Ana Loza, CNP, Ольга Gonzalez, CNP, Olimpia Knott, CNP,  Chandler Dueñas, [facility], Artur Braun, CNP, Beth Kramer, CNP, Rick Morales, CNP, Khloe Razo, CNP, Bessie De Jesus, CNP, Judi Simpson PA-C, Kamilah Lima, [facility], Sherri Morales, CNP, Nela Escalante, [facility]    Progress Note    7/23/2023    7:26 AM    Name:   Tova Medel  MRN:     [id number]     Acct:      [de-identified]   Room:   2010/2010-02  IP Day:  5  Admit Date:  7/18/2023 12:16 PM    PCP:   Santy Reardon MD  Code Status:  Full Code    Subjective:     C/C:   Chief Complaint   Patient presents with    Wound Infection     Patient arrived via EMS from [address] [place] [address] to be evaluated by podiatry. Patient has a right foot infection with a hx of osteomyelitis. Interval History Status: improved. Patient is resting comfortably, sleeping but easily aroused. Denies any complaints of chest pain, shortness of breath, nausea or vomiting, fevers or chills or acute complaints. Brief History: This 26-year-old female presents with a complaint of right foot wound is admitted for IV antibiotics and podiatry evaluation. Concern for abscess with osteomyelitis and scheduled for surgery on the 21st for I&D and debridement of the bone.     Review of Systems:     Constitutional: (24Hr): Intake/Output Summary (Last 24 hours) at 7/23/2023 0726  Last data filed at 7/23/2023 0528  Gross per 24 hour   Intake 2030.61 ml   Output --   Net 2030.61 ml       Labs:  Hematology:  Recent Labs     07/20/23  0739 07/21/23  0606 07/22/23  0523 07/23/23  0505   WBC 7.0 8.3 10.5 9.5   RBC 3.54* 3.80* 3.88* 3.48*   HGB 10.2* 11.0* 11.2* 10.2*   HCT 32.8* 35.0* 35.0* 31.8*   MCV 92.7 92.1 90.2 91.4   MCH 28.8 28.9 28.9 29.3   MCHC 31.1 31.4 32.0 32.1   RDW 15.7* 15.8* 15.3* 16.0*    245 247 213   MPV 10.0 10.4 10.7 10.8   SEDRATE 41*  --  62*  --    CRP 11.0*  --  6.6*  --      Chemistry:  Recent Labs     07/21/23  0606 07/22/23  0523 07/23/23  0505    137 140   K 3.6* 4.0 3.4*   * 104 107   CO2 24 24 26   GLUCOSE 85 112* 81   BUN 10 10 16   CREATININE 0.6 0.5 0.7   ANIONGAP 9 9 7*   LABGLOM >60 >60 >60   CALCIUM 8.3* 8.4* 8.1*     Recent Labs     07/21/23  0606 07/22/23  0523 07/23/23  0505   PROT 5.9* 5.8* 5.6*   LABALBU 2.8* 2.9* 2.8*   AST 19 21 15   ALT 12 14 13   ALKPHOS 61 66 58   BILITOT 0.3 0.3 0.3     ABG:No results found for: POCPH, PHART, PH, POCPCO2, QVU5UAB, PCO2, POCPO2, PO2ART, PO2, POCHCO3, CXR3XKH, HCO3, NBEA, PBEA, BEART, BE, THGBART, THB, SOW4CMD, KMQE0XSF, G2JXAEDU, O2SAT, FIO2  Lab Results   Component Value Date/Time    SPECIAL 17ML LW 07/18/2023 01:24 PM     Lab Results   Component Value Date/Time    CULTURE NO GROWTH 14 HOURS 07/21/2023 05:00 PM       Radiology:  XR FOOT RIGHT (MIN 3 VIEWS)    Result Date: 7/18/2023  Diffuse soft tissue swelling and arthritic changes without acute osseous abnormality. Mineralization complicates assessment. RECOMMENDATION: Advise osteomyelitis is of concern would advise three-phase radionuclide imaging or MRI imaging. MRI ANKLE RIGHT WO CONTRAST    Result Date: 7/18/2023  1. Large dorsal ulceration at the heel with associated complex T2 heterogeneous focus along the inferior aspect which may reflect abscess versus phlegmon.

## 2023-07-23 NOTE — DISCHARGE INSTRUCTIONS
Podiatry:  -Patient is to leave wound VAC on and dressings intact until follow-up visit with her wound care center in [facility]  -She is to be transferred back to her facility in [place] and make an appointment with the [facility] wound care center within 1 week of discharge  -Please take all prescribed medications as instructed  -Please resume all home medications as instructed  -Please return to the emergency department if you notice any of the following systemic signs of infection arise: Nausea, vomiting, fever, chills, diarrhea, shortness of breath or chest pain

## 2023-07-24 PROBLEM — M86.171 ACUTE OSTEOMYELITIS OF RIGHT CALCANEUS (HCC): Status: ACTIVE | Noted: 2023-07-24

## 2023-07-24 LAB
ALBUMIN SERPL-MCNC: 2.8 G/DL (ref 3.5–5.2)
ALP SERPL-CCNC: 64 U/L (ref 35–104)
ALT SERPL-CCNC: 12 U/L (ref 5–33)
ANION GAP SERPL CALCULATED.3IONS-SCNC: 8 MMOL/L (ref 9–17)
AST SERPL-CCNC: 13 U/L
BASOPHILS # BLD: 0.04 K/UL (ref 0–0.2)
BASOPHILS NFR BLD: 1 % (ref 0–2)
BILIRUB SERPL-MCNC: 0.3 MG/DL (ref 0.3–1.2)
BUN SERPL-MCNC: 11 MG/DL (ref 8–23)
BUN/CREAT SERPL: 22 (ref 9–20)
CALCIUM SERPL-MCNC: 8.4 MG/DL (ref 8.6–10.4)
CHLORIDE SERPL-SCNC: 105 MMOL/L (ref 98–107)
CO2 SERPL-SCNC: 25 MMOL/L (ref 20–31)
CREAT SERPL-MCNC: 0.5 MG/DL (ref 0.5–0.9)
CRP SERPL HS-MCNC: 11.6 MG/L (ref 0–5)
EOSINOPHIL # BLD: 0.59 K/UL (ref 0–0.44)
EOSINOPHILS RELATIVE PERCENT: 7 % (ref 1–4)
ERYTHROCYTE [DISTWIDTH] IN BLOOD BY AUTOMATED COUNT: 16.1 % (ref 11.8–14.4)
ERYTHROCYTE [SEDIMENTATION RATE] IN BLOOD BY PHOTOMETRIC METHOD: 54 MM/HR (ref 0–30)
GFR SERPL CREATININE-BSD FRML MDRD: >60 ML/MIN/1.73M2
GLUCOSE SERPL-MCNC: 87 MG/DL (ref 70–99)
HCT VFR BLD AUTO: 34.2 % (ref 36.3–47.1)
HGB BLD-MCNC: 10.8 G/DL (ref 11.9–15.1)
IMM GRANULOCYTES # BLD AUTO: 0.04 K/UL (ref 0–0.3)
IMM GRANULOCYTES NFR BLD: 1 %
LYMPHOCYTES NFR BLD: 2.19 K/UL (ref 1.1–3.7)
LYMPHOCYTES RELATIVE PERCENT: 26 % (ref 24–43)
MCH RBC QN AUTO: 29 PG (ref 25.2–33.5)
MCHC RBC AUTO-ENTMCNC: 31.6 G/DL (ref 28.4–34.8)
MCV RBC AUTO: 91.9 FL (ref 82.6–102.9)
MICROORGANISM SPEC CULT: ABNORMAL
MICROORGANISM/AGENT SPEC: ABNORMAL
MICROORGANISM/AGENT SPEC: ABNORMAL
MONOCYTES NFR BLD: 0.7 K/UL (ref 0.1–1.2)
MONOCYTES NFR BLD: 8 % (ref 3–12)
NEUTROPHILS NFR BLD: 57 % (ref 36–65)
NEUTS SEG NFR BLD: 4.9 K/UL (ref 1.5–8.1)
NRBC BLD-RTO: 0 PER 100 WBC
PLATELET # BLD AUTO: 207 K/UL (ref 138–453)
PMV BLD AUTO: 10.7 FL (ref 8.1–13.5)
POTASSIUM SERPL-SCNC: 3.8 MMOL/L (ref 3.7–5.3)
PROT SERPL-MCNC: 6 G/DL (ref 6.4–8.3)
RBC # BLD AUTO: 3.72 M/UL (ref 3.95–5.11)
RBC # BLD: ABNORMAL 10*6/UL
SODIUM SERPL-SCNC: 138 MMOL/L (ref 135–144)
SPECIMEN DESCRIPTION: ABNORMAL
VANCOMYCIN SERPL-MCNC: 23 UG/ML
WBC OTHER # BLD: 8.5 K/UL (ref 3.5–11.3)

## 2023-07-24 PROCEDURE — 97110 THERAPEUTIC EXERCISES: CPT

## 2023-07-24 PROCEDURE — 2500000003 HC RX 250 WO HCPCS

## 2023-07-24 PROCEDURE — 99222 1ST HOSP IP/OBS MODERATE 55: CPT | Performed by: NURSE PRACTITIONER

## 2023-07-24 PROCEDURE — 85652 RBC SED RATE AUTOMATED: CPT

## 2023-07-24 PROCEDURE — 36415 COLL VENOUS BLD VENIPUNCTURE: CPT

## 2023-07-24 PROCEDURE — 85027 COMPLETE CBC AUTOMATED: CPT

## 2023-07-24 PROCEDURE — 86140 C-REACTIVE PROTEIN: CPT

## 2023-07-24 PROCEDURE — 80053 COMPREHEN METABOLIC PANEL: CPT

## 2023-07-24 PROCEDURE — 97530 THERAPEUTIC ACTIVITIES: CPT

## 2023-07-24 PROCEDURE — 2580000003 HC RX 258: Performed by: INTERNAL MEDICINE

## 2023-07-24 PROCEDURE — 1200000000 HC SEMI PRIVATE

## 2023-07-24 PROCEDURE — 97112 NEUROMUSCULAR REEDUCATION: CPT

## 2023-07-24 PROCEDURE — 6360000002 HC RX W HCPCS: Performed by: NURSE PRACTITIONER

## 2023-07-24 PROCEDURE — 97535 SELF CARE MNGMENT TRAINING: CPT

## 2023-07-24 PROCEDURE — 80202 ASSAY OF VANCOMYCIN: CPT

## 2023-07-24 PROCEDURE — 99239 HOSP IP/OBS DSCHRG MGMT >30: CPT | Performed by: NURSE PRACTITIONER

## 2023-07-24 PROCEDURE — 2580000003 HC RX 258

## 2023-07-24 PROCEDURE — 6370000000 HC RX 637 (ALT 250 FOR IP)

## 2023-07-24 PROCEDURE — 2580000003 HC RX 258: Performed by: NURSE PRACTITIONER

## 2023-07-24 PROCEDURE — 6360000002 HC RX W HCPCS: Performed by: INTERNAL MEDICINE

## 2023-07-24 RX ORDER — HYDROCODONE BITARTRATE AND ACETAMINOPHEN 7.5; 325 MG/1; MG/1
1 TABLET ORAL EVERY 6 HOURS PRN
Qty: 12 TABLET | Refills: 0 | Status: SHIPPED | OUTPATIENT
Start: 2023-07-24 | End: 2023-07-27

## 2023-07-24 RX ORDER — HYDROCODONE BITARTRATE AND ACETAMINOPHEN 5; 325 MG/1; MG/1
2 TABLET ORAL EVERY 4 HOURS PRN
Qty: 36 TABLET | Refills: 0 | Status: CANCELLED | DISCHARGE
Start: 2023-07-24 | End: 2023-07-27

## 2023-07-24 RX ADMIN — SENNOSIDES AND DOCUSATE SODIUM 2 TABLET: 50; 8.6 TABLET ORAL at 09:05

## 2023-07-24 RX ADMIN — VANCOMYCIN HYDROCHLORIDE 1250 MG: 5 INJECTION, POWDER, LYOPHILIZED, FOR SOLUTION INTRAVENOUS at 13:49

## 2023-07-24 RX ADMIN — APIXABAN 5 MG: 5 TABLET, FILM COATED ORAL at 19:44

## 2023-07-24 RX ADMIN — CEFEPIME 2000 MG: 2 INJECTION, POWDER, FOR SOLUTION INTRAVENOUS at 19:44

## 2023-07-24 RX ADMIN — CEFEPIME 2000 MG: 2 INJECTION, POWDER, FOR SOLUTION INTRAVENOUS at 12:29

## 2023-07-24 RX ADMIN — SODIUM CHLORIDE, PRESERVATIVE FREE 10 ML: 5 INJECTION INTRAVENOUS at 19:40

## 2023-07-24 RX ADMIN — HYDROCODONE BITARTRATE AND ACETAMINOPHEN 2 TABLET: 5; 325 TABLET ORAL at 04:47

## 2023-07-24 RX ADMIN — APIXABAN 5 MG: 5 TABLET, FILM COATED ORAL at 09:05

## 2023-07-24 RX ADMIN — VANCOMYCIN HYDROCHLORIDE 1000 MG: 1 INJECTION, POWDER, LYOPHILIZED, FOR SOLUTION INTRAVENOUS at 03:08

## 2023-07-24 RX ADMIN — HYDROCODONE BITARTRATE AND ACETAMINOPHEN 2 TABLET: 5; 325 TABLET ORAL at 22:36

## 2023-07-24 RX ADMIN — SODIUM CHLORIDE: 9 INJECTION, SOLUTION INTRAVENOUS at 03:07

## 2023-07-24 RX ADMIN — METOPROLOL TARTRATE 5 MG: 5 INJECTION INTRAVENOUS at 04:48

## 2023-07-24 RX ADMIN — SODIUM CHLORIDE, PRESERVATIVE FREE 10 ML: 5 INJECTION INTRAVENOUS at 09:06

## 2023-07-24 RX ADMIN — HYDROCODONE BITARTRATE AND ACETAMINOPHEN 2 TABLET: 5; 325 TABLET ORAL at 13:49

## 2023-07-24 RX ADMIN — ONDANSETRON 4 MG: 4 TABLET, ORALLY DISINTEGRATING ORAL at 12:11

## 2023-07-24 ASSESSMENT — PAIN DESCRIPTION - PAIN TYPE: TYPE: CHRONIC PAIN;SURGICAL PAIN

## 2023-07-24 ASSESSMENT — PAIN SCALES - GENERAL
PAINLEVEL_OUTOF10: 9
PAINLEVEL_OUTOF10: 6
PAINLEVEL_OUTOF10: 8
PAINLEVEL_OUTOF10: 9

## 2023-07-24 ASSESSMENT — PAIN DESCRIPTION - ORIENTATION
ORIENTATION: RIGHT

## 2023-07-24 ASSESSMENT — PAIN DESCRIPTION - DESCRIPTORS
DESCRIPTORS: ACHING
DESCRIPTORS: ACHING
DESCRIPTORS: ACHING;SORE;SHARP

## 2023-07-24 ASSESSMENT — PAIN DESCRIPTION - LOCATION
LOCATION: LEG;FOOT
LOCATION: LEG
LOCATION: LEG

## 2023-07-24 ASSESSMENT — PAIN DESCRIPTION - ONSET: ONSET: ON-GOING

## 2023-07-24 ASSESSMENT — PAIN DESCRIPTION - FREQUENCY: FREQUENCY: CONTINUOUS

## 2023-07-24 ASSESSMENT — PAIN - FUNCTIONAL ASSESSMENT: PAIN_FUNCTIONAL_ASSESSMENT: PREVENTS OR INTERFERES SOME ACTIVE ACTIVITIES AND ADLS

## 2023-07-24 NOTE — PROGRESS NOTES
Physical Therapy  Facility/Department: [facility] MED SURG  Daily Treatment Note  NAME: Charmel Goodpasture  : 1940  MRN: [id number]    Date of Service: 2023    Discharge Recommendations:  Patient would benefit from continued therapy after discharge      Pt currently functioning below baseline. Recommend daily inpatient skilled therapy at time of discharge to maximize long term outcomes and prevent re-admission. Please refer to AM-PAC score for current level of function. Patient Diagnosis(es): The primary encounter diagnosis was Right foot infection. A diagnosis of Acute osteomyelitis of right calcaneus [facility]) was also pertinent to this visit. Assessment   Assessment: Pt with global deconditioning limiting all mobility. Pt required max A x2 for bed mobility and transfer to sitting positon at EOB. EOB x 10 mins with focus on core strengthening and WSing. AM-PAC score of  for current level of function. Activity Tolerance: Patient limited by pain; Patient limited by endurance   AM-PAC Score: 8    Plan    Physcial Therapy Plan  General Plan: 5-7 times per week  Current Treatment Recommendations: Strengthening;Balance training; Endurance training;Neuromuscular re-education;Patient/Caregiver education & training; Safety education & training;Home exercise program;Therapeutic activities; Positioning     Restrictions  Restrictions/Precautions  Restrictions/Precautions: Weight Bearing, Bed Alarm, Up as Tolerated, General Precautions  Required Braces or Orthoses?: No  Lower Extremity Weight Bearing Restrictions  Right Lower Extremity Weight Bearing: Non Weight Bearing  Position Activity Restriction  Other position/activity restrictions: IV, bilat., NWB R LE, wound vac     Subjective    Subjective  Subjective: Pt agreeable to PT session.   Orientation  Overall Orientation Status: Within Functional Limits  Cognition  Overall Cognitive Status: Exceptions  Arousal/Alertness: Appropriate responses to stimuli  Following Commands: place  Restraints  Restraints Initially in Place: No       Goals  Short Term Goals  Time Frame for Short Term Goals: 12 visits  Short Term Goal 1: Patient will tolerate 30 minutes of ther-ex and ther-act. Short Term Goal 2: Patient will performed bed mobility with mod assist x 2. Short Term Goal 3: Pt. to be indep with pressure relief in bed by rolling L/R, moving UEs / LEs  Short Term Goal 4: Patient will tolerate sitting EOB with supervision. Patient Goals   Patient Goals : Improve back pain    Education  Patient Education  Education Given To: Patient  Education Provided: Role of Therapy;Plan of Care;Home Exercise Program;Energy Conservation; Fall Prevention Strategies  Education Provided Comments: imp of rolling in bed for pressure relief  Education Method: Verbal;Teach Back  Education Outcome: Verbalized understanding;Continued education needed    Therapy Time   Individual Concurrent Group Co-treatment   Time In [id number]   Time Out 6340     7929   Minutes 8     [address] [place], [place]

## 2023-07-24 NOTE — PLAN OF CARE
Pain level assessment complete. Patient educated on pain scale and control interventions  PRN pain medication given per patient request  Patient instructed to call out with new onset of pain or unrelieved pain     Problem: Pain  Goal: Verbalizes/displays adequate comfort level or baseline comfort level  Outcome: Progressing     Siderails up x 2  Hourly rounding  Call light in reach  Instructed to call for assist before attempting out of bed. Remains free from falls and accidental injury at this time   Floor free from obstacles  Bed is locked and in lowest position  Adequate lighting provided  Bed alarm on, [place] star and Stay with Me signs posted      Problem: Pain  Goal: Verbalizes/displays adequate comfort level or baseline comfort level  Outcome: Progressing    Checked for incontinence every 2 hours and prn. Pericare as needed. Assisted to reposition off back frequently. On waffle mattress. Heels off bed with pillows. Problem: Skin/Tissue Integrity  Goal: Absence of new skin breakdown  Description: 1. Monitor for areas of redness and/or skin breakdown  2. Assess vascular access sites hourly  3. Every 4-6 hours minimum:  Change oxygen saturation probe site  4. Every 4-6 hours:  If on nasal continuous positive airway pressure, respiratory therapy assess nares and determine need for appliance change or resting period.   Outcome: Progressing

## 2023-07-24 NOTE — CARE COORDINATION
Social work; pt ok to go to [address] as a return today. Alla time from [address] Faxed megha to: fax [phone]  Phone for report: [phone]  Travis Alves in admissions [facility]    Social work; spoke to [employer] and updated them. Planned 1:45 pm with [facility]. HOwever new lab information was received. ID will update information on antibiotics for snf. Will change time as needed. Jayjay Rico [facility]    Social work: [facility] will not accept the pt tonight at 9 or 10 pm.  They are not able to get medications that late. Will reschedule for 9 am if [facility] does not have any cancellations or can find another ambulance for early today. Pt has to arrive there by about 4 pm.   Will Need to fax updated AVS/MEGHA when antibiotics are determined.   Jayjay Rcio [facility]

## 2023-07-24 NOTE — CONSULTS
[facility]  Initial Consult Note  Date: 7/24/2023    Hospital day :6     Impression:   Right calcaneal ulceration with abscess/osteomyelitis  Status post partial calcanectomy of the right foot, soft tissue transfer with peroneal artery based advancement flap, extensive secondary closure of the right foot 7/21/2023  Right foot cellulitis secondary to above  Right lower extremity venous insufficiency  Essential hypertension    Recommendations   The patient can continue on IV cefepime and vancomycin  Right foot cultures positive for MRSA and Citrobacter  There is a right foot tissue culture that is pending, Gram stain thus far with gram-positive coccobacilli and gram-negative rods. The bone culture is pending, thus far with Staphylococcus aureus  She has a right upper extremity PICC line in place  Continue IV Vancomycin and cefepime through 8/17/2023  Follow up in ID clinic with Dr. Felix Monzon in 2-3 weeks, [phone]  Weekly labs faxed to Dr. Felix Monzon to [phone]  Every Monday including CBC, BMP, vanco trough  Every Thursday including vanco trough, creatinine    Chief complaint/reason for consultation:   Right calcaneal osteomyelitis     History of Present Illness:   Kelley Rooney is a 79y.o.-year-old female who was initially admitted on 7/18/2023. Patient has history of hypertension, sleep apnea and venous insufficiency. The patient resides in a nursing facility and is not ambulatory. She had developed an ulceration on the right foot, she is not sure how long that has been present. Patient does follow with a podiatrist in [place] for the right heel pressure ulceration that developed into a wound/infection. Due to concern for worsening wound/infection, the podiatrist sent the patient to the emergency department. It was tender to touch, no associated fevers or chills. The patient was initially placed on clindamycin and cefepime.   MRI of the right foot heterogenous focus of the >=64 BACTERIAL SUSCEPTIBILITY PANEL MARCIN Preliminary    cefepime Sensitive 0.25 BACTERIAL SUSCEPTIBILITY PANEL MARCIN Preliminary    cefTRIAXone Resistant 16 BACTERIAL SUSCEPTIBILITY PANEL MARCIN Preliminary    gentamicin Sensitive <=1 BACTERIAL SUSCEPTIBILITY PANEL MARCIN Preliminary    levofloxacin Sensitive 0.5 BACTERIAL SUSCEPTIBILITY PANEL MARCIN Preliminary    piperacillin-tazobactam Resistant >=128 BACTERIAL SUSCEPTIBILITY PANEL MARCIN Preliminary    tobramycin Sensitive <=1 BACTERIAL SUSCEPTIBILITY PANEL MARCIN Preliminary    trimethoprim-sulfamethoxazole Sensitive <=20 BACTERIAL SUSCEPTIBILITY PANEL MARCIN Preliminary     Condensed View         Specimen Collected: 07/18/23 14:40 EDT Last Resulted: 07/24/23 10:52 EDT           Culture, Anaerobic and Aerobic [[id number]] (Abnormal) Collected: 07/21/23 1659   Order Status: Completed Specimen: Tissue from Foot Updated: 07/24/23 0842    Specimen Description . FOOT    Direct Exam RARE NEUTROPHILS Abnormal      RARE GRAM POSITIVE COCCOBACILLI Abnormal      RARE GRAM NEGATIVE RODS Abnormal     Culture Mixed skin zia     No anaerobic organisms isolated at 3 days. Culture, Anaerobic and Aerobic [[id number]] (Abnormal) Collected: 07/21/23 1700   Order Status: Completed Specimen: Bone from Foot Updated: 07/24/23 0842    Specimen Description . FOOT    Direct Exam NO NEUTROPHILS SEEN     NO ORGANISMS SEEN    Culture STAPHYLOCOCCUS AUREUS SCANT GROWTH Abnormal      No anaerobic organisms isolated at 3 days. Blood Culture 1 [[id number]] Collected: 07/18/23 1324   Order Status: Completed Specimen: Blood Updated: 07/23/23 1808    Specimen Description . BLOOD    Special Requests 17ML LW    Culture NO GROWTH 5 DAYS   Culture, Blood 2 [[id number]] Collected: 07/18/23 1300   Order Status: Completed Specimen: Blood Updated: 07/23/23 1808    Specimen Description . BLOOD    Special Requests LFA, BW    Culture NO GROWTH 5 DAYS       Electronically signed by KILO Benitez CNP on

## 2023-07-24 NOTE — PROGRESS NOTES
[facility]  Office: [address], DO, Kesha Keyes, DO, Estee Gonzalez, DO, Chelsea Vickers Blood, DO, Melisa Fisher MD, Binh rFank MD, Adrian Lao MD, Mindi Abebe MD,  Elizabet Davis MD, Kelly Dennison MD, Oscar Yeung, DO, Diego Angulo MD,  Tutu Glez MD, Jennifer Levine MD, Charlesetta Burkitt, DO, Michael Darnell MD,  Ovidio Mathis DO, Marielos Vasquez MD, Roya Kim MD, Tk Troy MD, Lu Willis MD,  Yong Bamberger, MD, Greg Ballard MD, Disha Lester DO, Tara John MD,  Jake Young MD, Yola Parish, Mily Hernandez, CNP, Brian Lewis, CNP, Vicky Doan, CNP,  Benjie Gallo, [facility], Samantha Hess, CNP, Demetrice Rivers, CNP, Astrid Hernandez, CNP, Ragena Rubinstein, CNP, Jyoti Lu, CNP, ALLEGRA TuttleC, Papito Styles, [facility], Jud Stephens, CNP, Brea Cross, [facility]    Progress Note    7/24/2023    9:49 AM    Name:   Rachel Mosley  MRN:     [id number]     Acct:      [de-identified]   Room:   2010/2010-02  IP Day:  6  Admit Date:  7/18/2023 12:16 PM    PCP:   Silvio Salmon MD  Code Status:  Full Code    Subjective:     C/C:   Chief Complaint   Patient presents with    Wound Infection     Patient arrived via EMS from [address] [place] [address] to be evaluated by podiatry. Patient has a right foot infection with a hx of osteomyelitis. Interval History Status: improved. Patient is lying comfortably in bed, VSS. She complains of some mild nausea following something she ate at breakfast. She does continue to have pain to her right foot but states the pain meds are helping. Wound cultures showing MRSA and now citrobacter  Brief History:   Per my partner  This 79-year-old female presents with a complaint of right foot wound is admitted for IV antibiotics and podiatry evaluation.   Concern for abscess with osteomyelitis and scheduled for surgery

## 2023-07-24 NOTE — PROGRESS NOTES
Occupational Therapy  Facility/Department: [facility]  Rehabilitation Occupational Therapy Daily Treatment Note    Date: 23  Patient Name: Tamika Amaro       Room:   MRN: [id number]  Account: [de-identified]   : 1940  (79 y.o.) Gender: female      ANGÉLICA Wilson reports patient is medically stable for therapy treatment this date. Chart reviewed prior to treatment and patient is agreeable for therapy. All lines intact and patient positioned comfortably at end of treatment. All patient needs addressed prior to ending therapy session. Pt currently functioning below baseline. Recommend daily inpatient skilled therapy at time of discharge to maximize long term outcomes and prevent re-admission. Please refer to AM-PAC score for current level of function. Past Medical History:  has a past medical history of DVT (deep venous thrombosis) ([address]), Hypertension, Lumbar stenosis, ALANA (obstructive sleep apnea), and Venous insufficiency. Past Surgical History:   has a past surgical history that includes Foot Osteotomy (Right, 2023). Restrictions  Restrictions/Precautions: Weight Bearing, Bed Alarm, Up as Tolerated, General Precautions  Other position/activity restrictions: IV, bilat., NWB R LE, wound vac  Right Lower Extremity Weight Bearing: Non Weight Bearing  Required Braces or Orthoses?: No    Subjective  Subjective: Pt resting in bed upon arrival agreeable to therapy. RN notified of pts request for pain meds. Restrictions/Precautions: Weight Bearing;Bed Alarm; Up as Tolerated;General Precautions             Objective     Cognition  Overall Cognitive Status: Exceptions  Arousal/Alertness: Appropriate responses to stimuli  Following Commands: Follows one step commands with increased time; Follows one step commands with repetition  Attention Span: Attends with cues to redirect  Memory: Decreased short term memory;Decreased recall of recent events  Safety Judgement: Decreased awareness

## 2023-07-24 NOTE — CARE COORDINATION
Social work; Ellen [facility]oy from [facility] can take pt back. Contact gilma direct line [phone] in admissions. Await ID final orders. Will need megha, Rx and discharge order at discharge.  Felicia fu

## 2023-07-24 NOTE — PLAN OF CARE
Problem: Discharge Planning  Goal: Discharge to home or other facility with appropriate resources  7/24/2023 1115 by Lucian Zacarias RN  Outcome: Progressing  Flowsheets (Taken 7/24/2023 0101)  Discharge to home or other facility with appropriate resources:   Identify barriers to discharge with patient and caregiver   Arrange for needed discharge resources and transportation as appropriate   Identify discharge learning needs (meds, wound care, etc)   Refer to discharge planning if patient needs post-hospital services based on physician order or complex needs related to functional status, cognitive ability or social support system  7/24/2023 0348 by Ashok Kramer RN  Outcome: Progressing     Problem: Pain  Goal: Verbalizes/displays adequate comfort level or baseline comfort level  7/24/2023 1115 by Lucian Zacarias RN  Outcome: Progressing  7/24/2023 0348 by Ashok Kramer RN  Outcome: Progressing     Problem: Safety - Adult  Goal: Free from fall injury  7/24/2023 1115 by Lucian Zacarias RN  Outcome: Progressing  7/24/2023 0348 by Ashok Kramer RN  Outcome: Progressing     Problem: Skin/Tissue Integrity  Goal: Absence of new skin breakdown  Description: 1. Monitor for areas of redness and/or skin breakdown  2. Assess vascular access sites hourly  3. Every 4-6 hours minimum:  Change oxygen saturation probe site  4. Every 4-6 hours:  If on nasal continuous positive airway pressure, respiratory therapy assess nares and determine need for appliance change or resting period.   7/24/2023 1115 by Lucian Zacarias RN  Outcome: Progressing  7/24/2023 0348 by Ashok Kramer RN  Outcome: Progressing     Problem: Skin/Tissue Integrity - Adult  Goal: Skin integrity remains intact  7/24/2023 1115 by Lucian Zacarias RN  Outcome: Progressing  Flowsheets  Taken 7/24/2023 1104  Skin Integrity Remains Intact: Monitor for areas of redness and/or skin breakdown  Taken 7/24/2023 0713  Skin Integrity Remains Intact: Monitor for areas of physical therapy/occupational therapy     Problem: Respiratory - Adult  Goal: Achieves optimal ventilation and oxygenation  7/24/2023 1115 by Shahram Adler RN  Outcome: Progressing  Flowsheets (Taken 7/24/2023 0713)  Achieves optimal ventilation and oxygenation: Assess for changes in respiratory status  7/24/2023 0348 by Calvin Mendosa RN  Outcome: Progressing  Flowsheets  Taken 7/23/2023 2314  Achieves optimal ventilation and oxygenation: Assess for changes in respiratory status  Taken 7/23/2023 1925  Achieves optimal ventilation and oxygenation:   Assess for changes in respiratory status   Assess for changes in mentation and behavior

## 2023-07-24 NOTE — DISCHARGE SUMMARY
[facility]  Office: 79[address]  1826, DO, Kesha Keyes, DO, Estee Gonzalez, DO, Chelsea Vickers Blood, DO, Melisa Fisher MD, Binh Frank MD, Adrian Lao MD, Mindi Abebe MD,  Elizabet Davis MD, Kelly Dennison MD, Oscar Yeung, DO, Diego Angulo MD,  Janelle Houston, DO, Wesley Blake MD, Jennifer Levine MD, Charlesetta Burkitt, DO, Michael Darnell MD,  Ovidio Mathis DO, Marielos Vasquez MD, Roya Kim MD, Tk Troy MD, Lu Willis MD,  Yong Bamberger, MD, Greg Ballard MD, Disha Lester DO, Tara John MD,  Jake Young MD, Yola Parish, CNP,  Kurt Nolen, CNP, Brian Lewis, CNP, Vicky Doan, CNP,  Benjie Gallo, [facility], Samantha Hess, CNP, Demetrice Rivers, CNP, Astrid Hernandez, CNP, Ragena Rubinstein, CNP, Jyoti Lu, CNP, Cindi Choe PA-C, Papito Styles, [facility], Jud Stephens, CNP, Brea Cross, [facility]    Discharge Summary     Patient ID: Rachel Mosley  :  1940   MRN: [id number]     ACCOUNT:  [de-identified]   Patient's PCP: Silvio Salmon MD  Admit Date: 2023   Discharge Date: 2023     Length of Stay: 6  Code Status:  Full Code  Admitting Physician: Flavia Spear DO  Discharge Physician: Brian Lewis, APRN - NP     Active Discharge Diagnoses:     Hospital Problem Lists:  Principal Problem:    Right foot infection  Active Problems:    Chronic deep vein thrombosis (DVT) of femoral vein of left lower extremity [facility])    Generalized weakness    Benign essential hypertension    ALANA (obstructive sleep apnea)    Venous insufficiency    On continuous oral anticoagulation    Acute osteomyelitis of right calcaneus ([address])  Resolved Problems:    * No resolved hospital problems.  *      Admission Condition:  serious     Discharged Condition: stable    Hospital Stay:     Hospital Course:  Rachel Cousin is a 79 y.o. female who was admitted for the management of  Right foot infection

## 2023-07-25 VITALS
BODY MASS INDEX: 40.43 KG/M2 | HEIGHT: 64 IN | RESPIRATION RATE: 17 BRPM | TEMPERATURE: 98.4 F | HEART RATE: 77 BPM | OXYGEN SATURATION: 93 % | WEIGHT: 236.8 LBS | SYSTOLIC BLOOD PRESSURE: 171 MMHG | DIASTOLIC BLOOD PRESSURE: 86 MMHG

## 2023-07-25 LAB
ALBUMIN SERPL-MCNC: 3 G/DL (ref 3.5–5.2)
ALP SERPL-CCNC: 64 U/L (ref 35–104)
ALT SERPL-CCNC: 10 U/L (ref 5–33)
ANION GAP SERPL CALCULATED.3IONS-SCNC: 8 MMOL/L (ref 9–17)
AST SERPL-CCNC: 13 U/L
BASOPHILS # BLD: 0.06 K/UL (ref 0–0.2)
BASOPHILS NFR BLD: 1 % (ref 0–2)
BILIRUB SERPL-MCNC: 0.3 MG/DL (ref 0.3–1.2)
BUN SERPL-MCNC: 8 MG/DL (ref 8–23)
BUN/CREAT SERPL: 16 (ref 9–20)
CALCIUM SERPL-MCNC: 8.5 MG/DL (ref 8.6–10.4)
CHLORIDE SERPL-SCNC: 105 MMOL/L (ref 98–107)
CO2 SERPL-SCNC: 26 MMOL/L (ref 20–31)
CREAT SERPL-MCNC: 0.5 MG/DL (ref 0.5–0.9)
EOSINOPHIL # BLD: 0.66 K/UL (ref 0–0.44)
EOSINOPHILS RELATIVE PERCENT: 8 % (ref 1–4)
ERYTHROCYTE [DISTWIDTH] IN BLOOD BY AUTOMATED COUNT: 16.3 % (ref 11.8–14.4)
GFR SERPL CREATININE-BSD FRML MDRD: >60 ML/MIN/1.73M2
GLUCOSE SERPL-MCNC: 86 MG/DL (ref 70–99)
HCT VFR BLD AUTO: 35.3 % (ref 36.3–47.1)
HGB BLD-MCNC: 10.9 G/DL (ref 11.9–15.1)
IMM GRANULOCYTES # BLD AUTO: 0.03 K/UL (ref 0–0.3)
IMM GRANULOCYTES NFR BLD: 0 %
LYMPHOCYTES NFR BLD: 2.27 K/UL (ref 1.1–3.7)
LYMPHOCYTES RELATIVE PERCENT: 28 % (ref 24–43)
MCH RBC QN AUTO: 28.5 PG (ref 25.2–33.5)
MCHC RBC AUTO-ENTMCNC: 30.9 G/DL (ref 28.4–34.8)
MCV RBC AUTO: 92.4 FL (ref 82.6–102.9)
MICROORGANISM SPEC CULT: ABNORMAL
MICROORGANISM SPEC CULT: ABNORMAL
MICROORGANISM/AGENT SPEC: ABNORMAL
MICROORGANISM/AGENT SPEC: ABNORMAL
MONOCYTES NFR BLD: 0.7 K/UL (ref 0.1–1.2)
MONOCYTES NFR BLD: 9 % (ref 3–12)
NEUTROPHILS NFR BLD: 54 % (ref 36–65)
NEUTS SEG NFR BLD: 4.42 K/UL (ref 1.5–8.1)
NRBC BLD-RTO: 0 PER 100 WBC
PLATELET # BLD AUTO: 229 K/UL (ref 138–453)
PMV BLD AUTO: 11.5 FL (ref 8.1–13.5)
POTASSIUM SERPL-SCNC: 3.6 MMOL/L (ref 3.7–5.3)
PROT SERPL-MCNC: 6 G/DL (ref 6.4–8.3)
RBC # BLD AUTO: 3.82 M/UL (ref 3.95–5.11)
RBC # BLD: ABNORMAL 10*6/UL
SODIUM SERPL-SCNC: 139 MMOL/L (ref 135–144)
SPECIMEN DESCRIPTION: ABNORMAL
WBC OTHER # BLD: 8.1 K/UL (ref 3.5–11.3)

## 2023-07-25 PROCEDURE — 6360000002 HC RX W HCPCS: Performed by: NURSE PRACTITIONER

## 2023-07-25 PROCEDURE — 2580000003 HC RX 258: Performed by: NURSE PRACTITIONER

## 2023-07-25 PROCEDURE — 85027 COMPLETE CBC AUTOMATED: CPT

## 2023-07-25 PROCEDURE — 6360000002 HC RX W HCPCS: Performed by: INTERNAL MEDICINE

## 2023-07-25 PROCEDURE — 36415 COLL VENOUS BLD VENIPUNCTURE: CPT

## 2023-07-25 PROCEDURE — 2580000003 HC RX 258: Performed by: INTERNAL MEDICINE

## 2023-07-25 PROCEDURE — 80053 COMPREHEN METABOLIC PANEL: CPT

## 2023-07-25 PROCEDURE — 2580000003 HC RX 258

## 2023-07-25 PROCEDURE — 6370000000 HC RX 637 (ALT 250 FOR IP)

## 2023-07-25 RX ADMIN — APIXABAN 5 MG: 5 TABLET, FILM COATED ORAL at 08:09

## 2023-07-25 RX ADMIN — HYDROCODONE BITARTRATE AND ACETAMINOPHEN 2 TABLET: 5; 325 TABLET ORAL at 06:43

## 2023-07-25 RX ADMIN — CEFEPIME 2000 MG: 2 INJECTION, POWDER, FOR SOLUTION INTRAVENOUS at 06:49

## 2023-07-25 RX ADMIN — SENNOSIDES AND DOCUSATE SODIUM 2 TABLET: 50; 8.6 TABLET ORAL at 08:09

## 2023-07-25 RX ADMIN — SODIUM CHLORIDE, PRESERVATIVE FREE 10 ML: 5 INJECTION INTRAVENOUS at 08:09

## 2023-07-25 RX ADMIN — POLYETHYLENE GLYCOL 3350 17 G: 17 POWDER, FOR SOLUTION ORAL at 08:09

## 2023-07-25 RX ADMIN — VANCOMYCIN HYDROCHLORIDE 1250 MG: 5 INJECTION, POWDER, LYOPHILIZED, FOR SOLUTION INTRAVENOUS at 04:10

## 2023-07-25 ASSESSMENT — PAIN - FUNCTIONAL ASSESSMENT: PAIN_FUNCTIONAL_ASSESSMENT: PREVENTS OR INTERFERES SOME ACTIVE ACTIVITIES AND ADLS

## 2023-07-25 ASSESSMENT — PAIN DESCRIPTION - DESCRIPTORS: DESCRIPTORS: TINGLING;SHARP

## 2023-07-25 ASSESSMENT — PAIN DESCRIPTION - ORIENTATION: ORIENTATION: RIGHT

## 2023-07-25 ASSESSMENT — PAIN DESCRIPTION - LOCATION: LOCATION: FOOT

## 2023-07-25 ASSESSMENT — PAIN SCALES - GENERAL: PAINLEVEL_OUTOF10: 9

## 2023-07-25 NOTE — PLAN OF CARE
Problem: Discharge Planning  Goal: Discharge to home or other facility with appropriate resources  Outcome: Progressing     Problem: Pain  Goal: Verbalizes/displays adequate comfort level or baseline comfort level  Outcome: Progressing     Problem: Safety - Adult  Goal: Free from fall injury  Outcome: Progressing     Problem: Skin/Tissue Integrity  Goal: Absence of new skin breakdown  Description: 1. Monitor for areas of redness and/or skin breakdown  2. Assess vascular access sites hourly  3. Every 4-6 hours minimum:  Change oxygen saturation probe site  4. Every 4-6 hours:  If on nasal continuous positive airway pressure, respiratory therapy assess nares and determine need for appliance change or resting period.   Outcome: Progressing     Problem: Skin/Tissue Integrity - Adult  Goal: Skin integrity remains intact  Outcome: Progressing     Problem: Genitourinary - Adult  Goal: Absence of urinary retention  Outcome: Progressing     Problem: Neurosensory - Adult  Goal: Achieves stable or improved neurological status  Outcome: Progressing     Problem: Neurosensory - Adult  Goal: Achieves stable or improved neurological status  Outcome: Progressing     Problem: Respiratory - Adult  Goal: Achieves optimal ventilation and oxygenation  Outcome: Progressing

## 2023-07-25 NOTE — PROGRESS NOTES
Pt discharged to [address] in stable condition with belongings  Discharge instructions given  Pt denies having any further questions at this time  Patient/family state they have everything they were admitted with.   Report called to Nurse Nicolas Rojas at facility at [facility] Patient

## 2023-07-26 LAB
MICROORGANISM SPEC CULT: ABNORMAL
MICROORGANISM SPEC CULT: ABNORMAL
MICROORGANISM/AGENT SPEC: ABNORMAL
SPECIMEN DESCRIPTION: ABNORMAL
SURGICAL PATHOLOGY REPORT: NORMAL

## 2023-07-27 ENCOUNTER — TELEPHONE (OUTPATIENT)
Dept: INFECTIOUS DISEASES | Age: 83
End: 2023-07-27

## 2023-07-27 NOTE — TELEPHONE ENCOUNTER
Dr. Wilburn Cap:    7/27/2023 1:25 PM  The problem with labs being done on a Friday is that we may get the results late and people are off so I did Monday and Thursday because it gives us work days during the week to take care of it

## 2023-07-27 NOTE — TELEPHONE ENCOUNTER
2023 1:08 PM  Carla Spore : 1940 a nurse from [address] [place] [address] called asking if labs can be done on Tuesday and Friday instead of the Monday and Thursday that is stated in the consult note from the hospital. Also, They stated her Vanco had to held today due to the trough being too high (22.7). Please advise.  Thank you, Liya  Read 2023 1:24 PM    Sent in perfect serve to Dr. Ángel Flores

## 2023-07-31 ENCOUNTER — TELEPHONE (OUTPATIENT)
Dept: INFECTIOUS DISEASES | Age: 83
End: 2023-07-31

## 2023-07-31 NOTE — TELEPHONE ENCOUNTER
Called Leatha Yo and left message on her voice mail as she said it was only her direct line. Ok to wait until Thurs for CBC.

## 2023-07-31 NOTE — TELEPHONE ENCOUNTER
Nurse from [facility] called stating they were unable to get CBC today due to PICC line not giving any blood return and they were going to have someone look at line. Nurse is asking if you are okay with continuing draw on Thurs or if you need a CBC done today.   Thanks   Betzaida Calvin [phone]

## 2023-08-17 ENCOUNTER — TELEPHONE (OUTPATIENT)
Dept: INFECTIOUS DISEASES | Age: 83
End: 2023-08-17

## 2023-08-17 NOTE — TELEPHONE ENCOUNTER
Nursing home called today about patient's missed appointment. Patient declined going to appointment due to her being in too much pain. She will call back to reschedule or nursing home will.

## 2023-08-25 ENCOUNTER — TELEPHONE (OUTPATIENT)
Dept: INFECTIOUS DISEASES | Age: 83
End: 2023-08-25

## 2023-08-25 NOTE — TELEPHONE ENCOUNTER
2023 1:02 PM  Juni Cristina : 1940 Nurse from facility is asking if they can have orders to pull the patient's PICC line since IV antibiotics are complete and also if you need any other weekly labs? Please advise.  Thank you, Liya Griffiths    Perfect serve sent to Dr. Ronny Torres

## 2023-08-25 NOTE — TELEPHONE ENCOUNTER
LM letting Veronica know line can be pulled and no labs needed. I left our office number in case there are questions.

## (undated) DEVICE — LOOP VES W25MM THK1MM MAXI RED SIL FLD REPELLENT 100 PER

## (undated) DEVICE — BNDG,ELSTC,MATRIX,STRL,4"X5YD,LF,HOOK&LP: Brand: MEDLINE

## (undated) DEVICE — C-ARMOR C-ARM EQUIPMENT COVERS CLEAR STERILE UNIVERSAL FIT 12 PER CASE: Brand: C-ARMOR

## (undated) DEVICE — PAD,ABDOMINAL,5"X9",ST,LF,25/BX: Brand: MEDLINE INDUSTRIES, INC.

## (undated) DEVICE — GLOVE SURG SZ 8 L12IN FNGR THK79MIL GRN LTX FREE

## (undated) DEVICE — TUBING SUCT 10FR MAL ALUM SHFT FN CAP VENT UNIV CONN W/ OBT

## (undated) DEVICE — CYSTO/BLADDER IRRIGATION SET, REGULATING CLAMP

## (undated) DEVICE — BLANKET WRM W29.9XL79.1IN UP BODY FORC AIR MISTRAL-AIR

## (undated) DEVICE — 450 ML BOTTLE OF 0.05% CHLORHEXIDINE GLUCONATE IN 99.95% STERILE WATER FOR IRRIGATION, USP AND APPLICATOR.: Brand: IRRISEPT ANTIMICROBIAL WOUND LAVAGE

## (undated) DEVICE — C-ARM: Brand: UNBRANDED

## (undated) DEVICE — SUTURE PROL SZ 3-0 L30IN NONABSORBABLE BLU L30MM FS-1 3/8 8675H

## (undated) DEVICE — Device

## (undated) DEVICE — GLOVE ORTHO 8   MSG9480

## (undated) DEVICE — PADDING CAST W6INXL4YD COT LO LINTING WYTEX

## (undated) DEVICE — BNDG,ELSTC,MATRIX,STRL,6"X5YD,LF,HOOK&LP: Brand: MEDLINE

## (undated) DEVICE — SOLUTION IRRIG 1000ML 0.9% SOD CHL USP POUR PLAS BTL

## (undated) DEVICE — SPLINT THMB W4XL15IN FBRGLS PD PRECUT LTWT DURABLE FAST SET

## (undated) DEVICE — ELECTRODE PT RET AD L9FT HI MOIST COND ADH HYDRGEL CORDED

## (undated) DEVICE — SUTURE MCRYL SZ 3-0 L27IN ABSRB UD L24MM PS-1 3/8 CIR PRIM Y936H

## (undated) DEVICE — PENCIL ES L3M BTTN SWCH HOLSTER W/ BLDE ELECTRD EDGE

## (undated) DEVICE — BANDAGE COBAN 4 IN COMPR W4INXL5YD FOAM COHESIVE QUIK STK SELF ADH SFT

## (undated) DEVICE — SOLUTION IRRIG 1000ML STRL H2O USP PLAS POUR BTL

## (undated) DEVICE — SUTURE MCRYL SZ 3-0 L27IN ABSRB UD L19MM PS-2 3/8 CIR PRIM Y427H

## (undated) DEVICE — SUTURE MCRYL SZ 3-0 L27IN ABSRB UD PS-2 3/8 CIR REV CUT NDL MCP427H

## (undated) DEVICE — CONTAINER,SPECIMEN,OR STERILE,4OZ: Brand: MEDLINE

## (undated) DEVICE — APPLICATOR MEDICATED 26 CC SOLUTION HI LT ORNG CHLORAPREP

## (undated) DEVICE — PRECISION THIN (5.5 X 0.38 X 18.0MM)